# Patient Record
Sex: FEMALE | Race: WHITE | Employment: OTHER | URBAN - METROPOLITAN AREA
[De-identification: names, ages, dates, MRNs, and addresses within clinical notes are randomized per-mention and may not be internally consistent; named-entity substitution may affect disease eponyms.]

---

## 2017-01-17 ENCOUNTER — MYC MEDICAL ADVICE (OUTPATIENT)
Dept: FAMILY MEDICINE | Facility: CLINIC | Age: 72
End: 2017-01-17

## 2017-01-17 DIAGNOSIS — F33.1 MAJOR DEPRESSIVE DISORDER, RECURRENT EPISODE, MODERATE (H): ICD-10-CM

## 2017-01-17 DIAGNOSIS — E13.49 OTHER DIABETIC NEUROLOGICAL COMPLICATION ASSOCIATED WITH OTHER SPECIFIED DIABETES MELLITUS (H): Primary | ICD-10-CM

## 2017-01-18 DIAGNOSIS — E11.40 TYPE 2 DIABETES MELLITUS WITH DIABETIC NEUROPATHY (H): Primary | ICD-10-CM

## 2017-01-18 NOTE — TELEPHONE ENCOUNTER
B-D Pen NDL Vashti [id number]      Last Written Prescription Date: 10/26/2015  Last Fill Quantity: 100,  # refills: 3   Last Office Visit with G, [facility] or [facility] prescribing provider: 08/08/2016-Dr Will

## 2017-01-19 NOTE — TELEPHONE ENCOUNTER
Medication is being filled for 1 time refill only due to:  Patient needs to be seen because due for Dm appt with PCP.  Mychart sent  Asif Vargas RN, BSN

## 2017-01-23 RX ORDER — GABAPENTIN 300 MG/1
600 CAPSULE ORAL 4 TIMES DAILY
Qty: 90 CAPSULE | Refills: 1 | Status: SHIPPED | OUTPATIENT
Start: 2017-01-23 | End: 2017-02-22

## 2017-01-23 RX ORDER — BUPROPION HYDROCHLORIDE 100 MG/1
100 TABLET, EXTENDED RELEASE ORAL 2 TIMES DAILY
Qty: 180 TABLET | Refills: 1 | Status: SHIPPED | OUTPATIENT
Start: 2017-01-23 | End: 2017-05-02

## 2017-01-23 NOTE — TELEPHONE ENCOUNTER
See below, pt wants gabapentin and bupropion refill, routed, SH do you want to see pt? Inform pt of plan via [employer].hart    Last OV: 7/27/16 SH-has seen endo several times since  Reason for visit: DM  Date last filled: 8/8/16 with one refill for gabapentin    PHQ-9 score:    PHQ-9 SCORE 7/27/2016   Total Score -   Total Score 18     Lorenza Troncoso RN, BSN  Message handled by Nurse Triage.

## 2017-01-30 ENCOUNTER — OFFICE VISIT (OUTPATIENT)
Dept: FAMILY MEDICINE | Facility: CLINIC | Age: 72
End: 2017-01-30
Payer: COMMERCIAL

## 2017-01-30 ENCOUNTER — RADIANT APPOINTMENT (OUTPATIENT)
Dept: GENERAL RADIOLOGY | Facility: CLINIC | Age: 72
End: 2017-01-30
Attending: NURSE PRACTITIONER
Payer: COMMERCIAL

## 2017-01-30 VITALS
OXYGEN SATURATION: 96 % | SYSTOLIC BLOOD PRESSURE: 138 MMHG | WEIGHT: 179 LBS | TEMPERATURE: 97.9 F | RESPIRATION RATE: 14 BRPM | HEIGHT: 65 IN | HEART RATE: 68 BPM | BODY MASS INDEX: 29.82 KG/M2 | DIASTOLIC BLOOD PRESSURE: 74 MMHG

## 2017-01-30 DIAGNOSIS — Z79.4 TYPE 2 DIABETES MELLITUS WITH DIABETIC NEUROPATHY, WITH LONG-TERM CURRENT USE OF INSULIN (H): Primary | ICD-10-CM

## 2017-01-30 DIAGNOSIS — E11.21 TYPE 2 DIABETES MELLITUS WITH DIABETIC NEPHROPATHY, WITH LONG-TERM CURRENT USE OF INSULIN (H): ICD-10-CM

## 2017-01-30 DIAGNOSIS — M54.16 LUMBAR RADICULAR PAIN: ICD-10-CM

## 2017-01-30 DIAGNOSIS — R87.810 ASCUS WITH POSITIVE HIGH RISK HPV CERVICAL: ICD-10-CM

## 2017-01-30 DIAGNOSIS — M25.552 HIP PAIN, LEFT: ICD-10-CM

## 2017-01-30 DIAGNOSIS — I10 ESSENTIAL HYPERTENSION WITH GOAL BLOOD PRESSURE LESS THAN 140/90: ICD-10-CM

## 2017-01-30 DIAGNOSIS — R87.610 ASCUS WITH POSITIVE HIGH RISK HPV CERVICAL: ICD-10-CM

## 2017-01-30 DIAGNOSIS — E11.40 TYPE 2 DIABETES MELLITUS WITH DIABETIC NEUROPATHY, WITH LONG-TERM CURRENT USE OF INSULIN (H): Primary | ICD-10-CM

## 2017-01-30 DIAGNOSIS — Z79.4 TYPE 2 DIABETES MELLITUS WITH DIABETIC NEPHROPATHY, WITH LONG-TERM CURRENT USE OF INSULIN (H): ICD-10-CM

## 2017-01-30 DIAGNOSIS — F33.1 MAJOR DEPRESSIVE DISORDER, RECURRENT EPISODE, MODERATE (H): ICD-10-CM

## 2017-01-30 DIAGNOSIS — K21.00 GASTROESOPHAGEAL REFLUX DISEASE WITH ESOPHAGITIS: ICD-10-CM

## 2017-01-30 LAB
ALBUMIN SERPL-MCNC: 4.1 G/DL (ref 3.4–5)
ALP SERPL-CCNC: 111 U/L (ref 40–150)
ALT SERPL W P-5'-P-CCNC: 89 U/L (ref 0–50)
ANION GAP SERPL CALCULATED.3IONS-SCNC: 8 MMOL/L (ref 3–14)
AST SERPL W P-5'-P-CCNC: 64 U/L (ref 0–45)
BASOPHILS # BLD AUTO: 0 10E9/L (ref 0–0.2)
BASOPHILS NFR BLD AUTO: 0.6 %
BILIRUB SERPL-MCNC: 0.4 MG/DL (ref 0.2–1.3)
BUN SERPL-MCNC: 10 MG/DL (ref 7–30)
CALCIUM SERPL-MCNC: 10 MG/DL (ref 8.5–10.1)
CHLORIDE SERPL-SCNC: 105 MMOL/L (ref 94–109)
CHOLEST SERPL-MCNC: 154 MG/DL
CO2 SERPL-SCNC: 26 MMOL/L (ref 20–32)
CREAT SERPL-MCNC: 0.69 MG/DL (ref 0.52–1.04)
DIFFERENTIAL METHOD BLD: NORMAL
EOSINOPHIL # BLD AUTO: 0.6 10E9/L (ref 0–0.7)
EOSINOPHIL NFR BLD AUTO: 7.6 %
ERYTHROCYTE [DISTWIDTH] IN BLOOD BY AUTOMATED COUNT: 14.1 % (ref 10–15)
GFR SERPL CREATININE-BSD FRML MDRD: 84 ML/MIN/1.7M2
GLUCOSE SERPL-MCNC: 244 MG/DL (ref 70–99)
HBA1C MFR BLD: 11.1 % (ref 4.3–6)
HCT VFR BLD AUTO: 38.6 % (ref 35–47)
HDLC SERPL-MCNC: 41 MG/DL
HGB BLD-MCNC: 12.5 G/DL (ref 11.7–15.7)
LDLC SERPL CALC-MCNC: 84 MG/DL
LYMPHOCYTES # BLD AUTO: 2.4 10E9/L (ref 0.8–5.3)
LYMPHOCYTES NFR BLD AUTO: 32.6 %
MCH RBC QN AUTO: 29.8 PG (ref 26.5–33)
MCHC RBC AUTO-ENTMCNC: 32.4 G/DL (ref 31.5–36.5)
MCV RBC AUTO: 92 FL (ref 78–100)
MONOCYTES # BLD AUTO: 0.6 10E9/L (ref 0–1.3)
MONOCYTES NFR BLD AUTO: 7.7 %
NEUTROPHILS # BLD AUTO: 3.7 10E9/L (ref 1.6–8.3)
NEUTROPHILS NFR BLD AUTO: 51.5 %
NONHDLC SERPL-MCNC: 113 MG/DL
PLATELET # BLD AUTO: 315 10E9/L (ref 150–450)
POTASSIUM SERPL-SCNC: 4.4 MMOL/L (ref 3.4–5.3)
PROT SERPL-MCNC: 7.6 G/DL (ref 6.8–8.8)
RBC # BLD AUTO: 4.2 10E12/L (ref 3.8–5.2)
SODIUM SERPL-SCNC: 139 MMOL/L (ref 133–144)
TRIGL SERPL-MCNC: 144 MG/DL
WBC # BLD AUTO: 7.3 10E9/L (ref 4–11)

## 2017-01-30 PROCEDURE — 80061 LIPID PANEL: CPT | Performed by: NURSE PRACTITIONER

## 2017-01-30 PROCEDURE — 87624 HPV HI-RISK TYP POOLED RSLT: CPT | Performed by: NURSE PRACTITIONER

## 2017-01-30 PROCEDURE — 73502 X-RAY EXAM HIP UNI 2-3 VIEWS: CPT

## 2017-01-30 PROCEDURE — 83036 HEMOGLOBIN GLYCOSYLATED A1C: CPT | Performed by: NURSE PRACTITIONER

## 2017-01-30 PROCEDURE — 85025 COMPLETE CBC W/AUTO DIFF WBC: CPT | Performed by: NURSE PRACTITIONER

## 2017-01-30 PROCEDURE — 88175 CYTOPATH C/V AUTO FLUID REDO: CPT | Performed by: NURSE PRACTITIONER

## 2017-01-30 PROCEDURE — 82043 UR ALBUMIN QUANTITATIVE: CPT | Performed by: NURSE PRACTITIONER

## 2017-01-30 PROCEDURE — 36415 COLL VENOUS BLD VENIPUNCTURE: CPT | Performed by: NURSE PRACTITIONER

## 2017-01-30 PROCEDURE — 99214 OFFICE O/P EST MOD 30 MIN: CPT | Performed by: NURSE PRACTITIONER

## 2017-01-30 PROCEDURE — 80053 COMPREHEN METABOLIC PANEL: CPT | Performed by: NURSE PRACTITIONER

## 2017-01-30 RX ORDER — QUETIAPINE FUMARATE 50 MG/1
50 TABLET, FILM COATED ORAL AT BEDTIME
Qty: 90 TABLET | Refills: 1 | Status: SHIPPED | OUTPATIENT
Start: 2017-01-30 | End: 2017-05-02

## 2017-01-30 RX ORDER — VENLAFAXINE HYDROCHLORIDE 150 MG/1
150 CAPSULE, EXTENDED RELEASE ORAL DAILY
Qty: 90 CAPSULE | Refills: 1 | Status: SHIPPED | OUTPATIENT
Start: 2017-01-30 | End: 2017-05-02

## 2017-01-30 RX ORDER — GLIPIZIDE 10 MG/1
10 TABLET, FILM COATED, EXTENDED RELEASE ORAL 2 TIMES DAILY
Qty: 180 TABLET | Refills: 1 | Status: SHIPPED | OUTPATIENT
Start: 2017-01-30 | End: 2017-05-02

## 2017-01-30 ASSESSMENT — ANXIETY QUESTIONNAIRES
7. FEELING AFRAID AS IF SOMETHING AWFUL MIGHT HAPPEN: MORE THAN HALF THE DAYS
GAD7 TOTAL SCORE: 14
3. WORRYING TOO MUCH ABOUT DIFFERENT THINGS: MORE THAN HALF THE DAYS
5. BEING SO RESTLESS THAT IT IS HARD TO SIT STILL: MORE THAN HALF THE DAYS
1. FEELING NERVOUS, ANXIOUS, OR ON EDGE: MORE THAN HALF THE DAYS
6. BECOMING EASILY ANNOYED OR IRRITABLE: MORE THAN HALF THE DAYS
2. NOT BEING ABLE TO STOP OR CONTROL WORRYING: MORE THAN HALF THE DAYS
IF YOU CHECKED OFF ANY PROBLEMS ON THIS QUESTIONNAIRE, HOW DIFFICULT HAVE THESE PROBLEMS MADE IT FOR YOU TO DO YOUR WORK, TAKE CARE OF THINGS AT HOME, OR GET ALONG WITH OTHER PEOPLE: SOMEWHAT DIFFICULT

## 2017-01-30 ASSESSMENT — PATIENT HEALTH QUESTIONNAIRE - PHQ9: 5. POOR APPETITE OR OVEREATING: MORE THAN HALF THE DAYS

## 2017-01-30 NOTE — PROGRESS NOTES
SUBJECTIVE:                                                    Patricia Aden is a 71 year old female who presents to clinic today for the following health issues:    Diabetes Follow-up      Patient is checking blood sugars: 2 times a week,  Fasting this morning 272.      Diabetic concerns: None     Symptoms of hypoglycemia (low blood sugar): none     Paresthesias (numbness or burning in feet) or sores: Yes      Date of last diabetic eye exam: 12/2016     Amount of exercise or physical activity: None    Problems taking medications regularly: No    Medication side effects: none    Diet: low salt and diabetic  Last A1C was 10.6.  Taking Glipzide 10 mg bid, metormin 1000 mg bid and lvemir 30 units every day.    Eating in the evenings, has craving for sugars.      Hypertension:  BP today 138/74, taking metoprolol 100 mg bid.   Cervical cancer screening: Colposcopy in 8/2016 ASCUS with HPV.  Recommend pap with cotest in 6 months.  Back pain: left lwoer back and left gluteal pain has increased over the past month, no known injury.  Seen by pain clinic 6 months for epidural injection, intersted in having this repeated.   Depression:  Feels it is well controlled. PHQ 9 score of 17, MELVINA 7 score of 14.    Problem list and histories reviewed & adjusted, as indicated.  Additional history: as documented    Patient Active Problem List   Diagnosis     Hyperlipidemia LDL goal <100     Osteoporosis     Advanced directives, counseling/discussion     Urgency incontinence     Anxiety     Health Care Home     Status post revision of total hip     Type 2 diabetes mellitus with diabetic nephropathy (H)     Microalbuminuria     Status post total knee replacement     Insulin-requiring or dependent type II diabetes mellitus (H)     Diabetes mellitus with coincident hypertension (H)     Pneumonia, organism unspecified     Constipation     Physical deconditioning     Hypothyroidism     Type 2 diabetes mellitus with diabetic neuropathy (H)      Hip pain     Fracture, femur (H)     Other specified hypothyroidism     Thyroid nodule     Hypoxia     Closed fracture of neck of right femur with routine healing, subsequent encounter     Encounter for long-term (current) use of insulin (H)     Essential hypertension with goal blood pressure less than 140/90     ASCUS with positive high risk HPV cervical     Major depressive disorder, recurrent episode, moderate (H)     Past Surgical History   Procedure Laterality Date     Orthopedic surgery       Cholecystectomy       Colonoscopy       Arthroplasty hip bilateral       Arthroplasty revision hip  7/9/2014     Procedure: ARTHROPLASTY REVISION HIP;  Surgeon: Huber Delgado MD;  Location: [facility] OR     Arthroplasty knee Left 12/8/2015     Procedure: ARTHROPLASTY KNEE;  Surgeon: Huber Delgado MD;  Location: [facility] OR       Social History   Substance Use Topics     Smoking status: Former Smoker -- 4 years     Types: Cigarettes     Smokeless tobacco: Never Used      Comment: quit 1977     Alcohol Use: No     Family History   Problem Relation Age of Onset     DIABETES Mother      DIABETES Maternal Grandmother      CANCER Mother      HEART DISEASE Father          Current Outpatient Prescriptions   Medication Sig Dispense Refill     gabapentin (NEURONTIN) 300 MG capsule Take 2 capsules (600 mg) by mouth 4 times daily 90 capsule 1     buPROPion (BUDEPRION SR) 100 MG 12 hr tablet Take 1 tablet (100 mg) by mouth 2 times daily 180 tablet 1     insulin pen needle (BD NILDA U/F) 32G X 4 MM Use 1-2 daily or as directed with LUCAS Blood, PT HAS REFILLS AT [facility] 100 each 0     gemfibrozil (LOPID) 600 MG tablet Take 1 tablet (600 mg) by mouth 2 times daily 60 tablet 3     glipiZIDE (GLIPIZIDE XL) 10 MG 24 hr tablet Take 1 tablet (10 mg) by mouth 2 times daily 60 tablet 3     metFORMIN (GLUCOPHAGE) 1000 MG tablet Take 1 tablet (1,000 mg) by mouth 2 times daily (with meals) 180 tablet 2     ACETAMINOPHEN PO Take 500 mg by mouth        QUEtiapine (SEROQUEL) 50 MG tablet Take 1 tablet (50 mg) by mouth At Bedtime (Patient taking differently: Take 50 mg by mouth nightly as needed ) 90 tablet 1     nystatin (MYCOSTATIN) cream Apply topically 3 times daily 60 g 0     insulin detemir (LEVEMIR) 100 UNIT/ML soln Inject 30 Units Subcutaneous At Bedtime 1 Month 11     metoprolol (LOPRESSOR) 100 MG tablet Take 1 tablet (100 mg) by mouth 2 times daily 180 tablet 3     simvastatin (ZOCOR) 80 MG tablet Take 0.5 tablets (40 mg) by mouth At Bedtime 45 tablet 3     venlafaxine (EFFEXOR-XR) 150 MG 24 hr capsule Take 1 capsule (150 mg) by mouth daily With breakfast 90 capsule 1     omeprazole (PRILOSEC) 20 MG capsule Take 1 capsule (20 mg) by mouth daily 90 capsule 1     blood glucose monitoring (ONE TOUCH DELICA) lancets Use to test blood sugars 3 times daily or as directed. 1 Box prn     blood glucose (ONE TOUCH ULTRA) test strip Use to test blood sugar 3 times daily or as directed. 100 strip 3     Omega-3 Fatty Acids (OMEGA-3 FISH OIL PO) Take 1,200 mg by mouth every evening        VITAMIN D, CHOLECALCIFEROL, PO Take 5,000 Units by mouth daily        Multiple Vitamins-Minerals (MULTIVITAMIN & MINERAL PO) Take 1 tablet by mouth every evening        Allergies   Allergen Reactions     Fish Allergy Anaphylaxis     Rowland roughy-anaphylaxsis     Penicillins Unknown     Recent Labs   Lab Test  11/04/16   1331  10/10/16   2156  09/02/16   1324   07/27/16   1121  06/06/16   0705   05/25/16   0645  04/22/16   1448   12/01/15   1103   04/15/15   1215   01/13/15   1139   11/11/13   1105   A1C  10.6*   --    --    --   8.2*   --    --   9.8*  10.0*   < >  9.2*   < >  11.0*   < >  11.0*   < >  8.6*   LDL   --    --    --    --    --    --    --    --   71   --    --    --    --    --   77   --   75   HDL   --    --    --    --    --    --    --    --   44*   --    --    --    --    --   47*   --   42*   TRIG   --    --    --    --    --    --    --    --   141   --    --   "  --    --    --   154*   --   132   ALT   --    --    --    --    --    --    --    --   68*   --   46   --   74*   < >  108*   --   99*   CR   --   0.71   --    --    --   0.61   < >  0.75  0.72   < >  0.71   < >  0.70   < >  0.62   < >  0.73   GFRESTIMATED   --   81   --    --    --   >90  Non  GFR Calc     < >  77  80   < >  81   < >  83   < >  >90  Non  GFR Calc     < >  80   GFRESTBLACK   --   >90   GFR Calc     --    --    --   >90   GFR Calc     < >  >90   GFR Calc    >90   GFR Calc     < >  >90   GFR Calc     < >  >90   GFR Calc     < >  >90   GFR Calc     < >  >90   POTASSIUM   --   4.6   --    --    --   4.1   < >  4.5  4.4   < >  4.0   < >  4.5   < >  4.4   < >  4.9   TSH  3.69   --   2.76   < >  4.91*   --    --    --   1.02   --    --    --    --    --    --    < >   --     < > = values in this interval not displayed.      BP Readings from Last 3 Encounters:   01/30/17 138/74   11/04/16 136/72   10/10/16 148/79    Wt Readings from Last 3 Encounters:   01/30/17 179 lb (81.194 kg)   11/04/16 172 lb (78.019 kg)   10/10/16 175 lb (79.379 kg)        ROS:  C: NEGATIVE for fever, chills, change in weight  E/M: NEGATIVE for ear, mouth and throat problems  R: NEGATIVE for significant cough or SOB  CV: NEGATIVE for chest pain, palpitations or peripheral edema  : negative for dysuria.    MUSCULOSKELETAL: increased lower back and left hip pain, last injection 6 months ago.      OBJECTIVE:                                                    /72 mmHg  Pulse 68  Temp(Src) 97.9  F (36.6  C) (Oral)  Resp 14  Ht 5' 5\" (1.651 m)  Wt 179 lb (81.194 kg)  BMI 29.79 kg/m2  SpO2 96%  Body mass index is 29.79 kg/(m^2).   GENERAL: healthy, alert and no distress  NECK: no adenopathy, no asymmetry, masses, or scars and thyroid normal to palpation  RESP: lungs clear to " auscultation - no rales, rhonchi or wheezes  CV: regular rate and rhythm, normal S1 S2, no S3 or S4, no murmur, click or rub, no peripheral edema   MS: left lower back and left gluteal tenderness upon palpation, no gross musculoskeletal defects noted, no edema  NEURO: Normal strength and tone, mentation intact and speech normal  Diabetic Foot Screen:  Any complaints of increased pain or numbness ? No   Is there a foot ulcer now or a history of foot ulcer? No   Does the foot have an abnormal shape? No   Are the nails thick, too long or ingrown? No   Are there any redness or open areas? No            Sensation Testing done at all points on the diagram with monofilament     Right Foot: Sensation Normal at all points  Left Foot: Sensation Normal at all points     Risk Category: 0- No loss of protective sensation  Performed by Susan Haase, APRN CNP       ASSESSMENT:                                                    See below    PLAN:                                                    Patricia was seen today for diabetes.    Diagnoses and all orders for this visit:    Type 2 diabetes mellitus with diabetic neuropathy, with long-term current use of insulin (H):  A1C today 11.1, will increase levemir to 35 units at bedtime.  -     CBC with platelets differential  -     Comprehensive metabolic panel  -     Lipid panel reflex to direct LDL  -     Hemoglobin A1c  -     Albumin Random Urine Quantitative  -     insulin detemir (LEVEMIR) 100 UNIT/ML injection; Inject 35 Units Subcutaneous At Bedtime for 30 doses  -     glipiZIDE (GLIPIZIDE XL) 10 MG 24 hr tablet; Take 1 tablet (10 mg) by mouth 2 times daily    Essential hypertension with goal blood pressure less than 140/90:  Well controlled    ASCUS with positive high risk HPV cervical  -     Pap imaged thin layer diagnostic with HPV (select HPV order below)  -     HPV High Risk Types DNA Cervical    Hip pain, left  -     XR Hip Left 2-3 Views; Future    Major depressive disorder,  recurrent episode, moderate (H)  -     QUEtiapine (SEROQUEL) 50 MG tablet; Take 1 tablet (50 mg) by mouth At Bedtime  -     venlafaxine (EFFEXOR-XR) 150 MG 24 hr capsule; Take 1 capsule (150 mg) by mouth daily With breakfast    Gastroesophageal reflux disease with esophagitis  -     omeprazole (PRILOSEC) 20 MG CR capsule; Take 1 capsule (20 mg) by mouth daily    Other orders  -     DEPRESSION ACTION PLAN (DAP)        FUTURE APPOINTMENTS:       - Follow-up visit in 3 months, sooner as needed.    Susan Haase, APRN [facility]

## 2017-01-30 NOTE — NURSING NOTE
"Chief Complaint   Patient presents with     Diabetes       Initial /72 mmHg  Pulse 68  Temp(Src) 97.9  F (36.6  C) (Oral)  Resp 14  Ht 5' 5\" (1.651 m)  Wt 179 lb (81.194 kg)  BMI 29.79 kg/m2  SpO2 96% Estimated body mass index is 29.79 kg/(m^2) as calculated from the following:    Height as of this encounter: 5' 5\" (1.651 m).    Weight as of this encounter: 179 lb (81.194 kg).  BP completed using cuff size: rachel Reyes CMA      "

## 2017-01-30 NOTE — Clinical Note
[facility]  [id number] [place] [id number] [phone]      February 3, 2017    Patricia Aden  [address] [facility] [address]    Dear Patricia,  This letter is in regards to the PAP smear you had done on 01/30/17. Your PAP (cervical cells) test result is normal.  Your HPV (Human Papilloma Virus) result again showed evidence of high risk types of HPV. To monitor, Susan Haase CNP, is recommending that we repeat a pap and HPV test again in 6 months. Please return in August 2017 for your next pap smear & HPV test.  If you have additional questions regarding this result, please call Leena NEIL at [phone].  Sincerely,  Susan Haase, APRN CNP/Lynette Nissen RN

## 2017-01-30 NOTE — MR AVS SNAPSHOT
After Visit Summary   1/30/2017    Patricia Aden    MRN: [id number]           Patient Information     Date Of Birth          1945        Visit Information        Provider Department      1/30/2017 1:00 PM Haase, Susan Rachele, APRN CNP [facility]        Today's Diagnoses     Essential hypertension with goal blood pressure less than 140/90    -  1     ASCUS with positive high risk HPV cervical         Type 2 diabetes mellitus with diabetic neuropathy, with long-term current use of insulin (H)            Follow-ups after your visit        Follow-up notes from your care team     Return in about 3 months (around 4/30/2017).      Who to contact     If you have questions or need follow up information about today's clinic visit or your schedule please contact [facility] directly at [phone].  Normal or non-critical lab and imaging results will be communicated to you by Noahhart, letter or phone within 4 business days after the clinic has received the results. If you do not hear from us within 7 days, please contact the clinic through Noahhart or phone. If you have a critical or abnormal lab result, we will notify you by phone as soon as possible.  Submit refill requests through [employer] or call your pharmacy and they will forward the refill request to us. Please allow 3 business days for your refill to be completed.          Additional Information About Your Visit        MyChart Information     [employer] gives you secure access to your electronic health record. If you see a primary care provider, you can also send messages to your care team and make appointments. If you have questions, please call your primary care clinic.  If you do not have a primary care provider, please call [phone] and they will assist you.        Care EveryWhere ID     This is your Care EveryWhere ID. This could be used by other organizations to access your [facility]  "records  [id number]        Your Vitals Were     Pulse Temperature Respirations Height BMI (Body Mass Index) Pulse Oximetry    68 97.9  F (36.6  C) (Oral) 14 5' 5\" (1.651 m) 29.79 kg/m2 96%       Blood Pressure from Last 3 Encounters:   01/30/17 160/72   11/04/16 136/72   10/10/16 148/79    Weight from Last 3 Encounters:   01/30/17 179 lb (81.194 kg)   11/04/16 172 lb (78.019 kg)   10/10/16 175 lb (79.379 kg)              We Performed the Following     Albumin Random Urine Quantitative     CBC with platelets differential     Comprehensive metabolic panel     DEPRESSION ACTION PLAN (DAP)     Hemoglobin A1c     HPV High Risk Types DNA Cervical     Lipid panel reflex to direct LDL     Pap imaged thin layer diagnostic with HPV (select HPV order below)          Today's Medication Changes          These changes are accurate as of: 1/30/17  2:03 PM.  If you have any questions, ask your nurse or doctor.               These medicines have changed or have updated prescriptions.        Dose/Directions    insulin detemir 100 UNIT/ML injection   Commonly known as:  LEVEMIR   This may have changed:  how much to take   Used for:  Type 2 diabetes mellitus with diabetic neuropathy, with long-term current use of insulin (H)   Changed by:  Haase, Susan Rachele, APRN CNP        Dose:  35 Units   Inject 35 Units Subcutaneous At Bedtime for 30 doses   Quantity:  10.5 mL   Refills:  3       QUEtiapine 50 MG tablet   Commonly known as:  SEROQUEL   This may have changed:    - when to take this  - reasons to take this   Used for:  Major depressive disorder, recurrent episode, moderate (H)        Dose:  50 mg   Take 1 tablet (50 mg) by mouth At Bedtime   Quantity:  90 tablet   Refills:  1            Where to get your medicines      These medications were sent to [facility] - [place], [place] - [address] AT [facility] [address] [facility] [address]     Phone:  [phone]    - insulin detemir " 100 UNIT/ML injection             Primary Care Provider Office Phone # Fax #    Susan Rachele Haase, APRN [phone]       [address] [facility] [address]        Thank you!     Thank you for choosing [facility]  for your care. Our goal is always to provide you with excellent care. Hearing back from our patients is one way we can continue to improve our services. Please take a few minutes to complete the written survey that you may receive in the mail after your visit with us. Thank you!             Your Updated Medication List - Protect others around you: Learn how to safely use, store and throw away your medicines at www.disposemymeds.org.          This list is accurate as of: 1/30/17  2:03 PM.  Always use your most recent med list.                   Brand Name Dispense Instructions for use    ACETAMINOPHEN PO      Take 500 mg by mouth       blood glucose monitoring lancets     1 Box    Use to test blood sugars 3 times daily or as directed.       blood glucose monitoring test strip    ONE TOUCH ULTRA    100 strip    Use to test blood sugar 3 times daily or as directed.       buPROPion 100 MG 12 hr tablet    BUDEPRION SR    180 tablet    Take 1 tablet (100 mg) by mouth 2 times daily       gabapentin 300 MG capsule    NEURONTIN    90 capsule    Take 2 capsules (600 mg) by mouth 4 times daily       glipiZIDE 10 MG 24 hr tablet    glipiZIDE XL    60 tablet    Take 1 tablet (10 mg) by mouth 2 times daily       insulin detemir 100 UNIT/ML injection    LEVEMIR    10.5 mL    Inject 35 Units Subcutaneous At Bedtime for 30 doses       insulin pen needle 32G X 4 MM    BD NILDA U/F    100 each    Use 1-2 daily or as directed with LUCAS Blood, PT HAS REFILLS AT [facility]       metFORMIN 1000 MG tablet    GLUCOPHAGE    180 tablet    Take 1 tablet (1,000 mg) by mouth 2 times daily (with meals)       metoprolol 100 MG tablet    LOPRESSOR    180 tablet     Take 1 tablet (100 mg) by mouth 2 times daily       MULTIVITAMIN & MINERAL PO      Take 1 tablet by mouth every evening       nystatin cream    MYCOSTATIN    60 g    Apply topically 3 times daily       OMEGA-3 FISH OIL PO      Take 1,200 mg by mouth every evening       omeprazole 20 MG CR capsule    priLOSEC    90 capsule    Take 1 capsule (20 mg) by mouth daily       QUEtiapine 50 MG tablet    SEROQUEL    90 tablet    Take 1 tablet (50 mg) by mouth At Bedtime       simvastatin 80 MG tablet    ZOCOR    45 tablet    Take 0.5 tablets (40 mg) by mouth At Bedtime       venlafaxine 150 MG 24 hr capsule    EFFEXOR-XR    90 capsule    Take 1 capsule (150 mg) by mouth daily With breakfast       VITAMIN D (CHOLECALCIFEROL) PO      Take 5,000 Units by mouth daily

## 2017-01-31 ENCOUNTER — TELEPHONE (OUTPATIENT)
Dept: PALLIATIVE MEDICINE | Facility: CLINIC | Age: 72
End: 2017-01-31

## 2017-01-31 LAB
CREAT UR-MCNC: 130 MG/DL
MICROALBUMIN UR-MCNC: 544 MG/L
MICROALBUMIN/CREAT UR: 418.46 MG/G CR (ref 0–25)

## 2017-01-31 ASSESSMENT — ANXIETY QUESTIONNAIRES: GAD7 TOTAL SCORE: 14

## 2017-01-31 ASSESSMENT — PATIENT HEALTH QUESTIONNAIRE - PHQ9: SUM OF ALL RESPONSES TO PHQ QUESTIONS 1-9: 17

## 2017-01-31 NOTE — PROGRESS NOTES
Quick Note:    Sid Gomez,   Your lab results are as below:  1) CBC (chehcks for anemia and infection) was normal.   2) Cholesterol was normal at 154, your LDL (bad cholesterol) was also normal. Continue on your pravastatin as prescribed.   3) Glucose was elevated at 244 (normal fasting is <100).    If you have any questions do not hesitate to call the clinic to discuss the results with me further.     Sincerely,    Susan Haase, CNP      ______

## 2017-01-31 NOTE — TELEPHONE ENCOUNTER
Called patient to scheduled LESI, interlaminar. LM 1/31/17    [facility] [place]

## 2017-01-31 NOTE — PROGRESS NOTES
Quick Note:    Hi Patricia,  The xray of your left hip is normal.  Sincerely,   Susan Haase, SUSANNE  ______

## 2017-02-01 LAB
COPATH REPORT: NORMAL
PAP: NORMAL

## 2017-02-03 LAB
FINAL DIAGNOSIS: ABNORMAL
HPV HR 12 DNA CVX QL NAA+PROBE: POSITIVE
HPV16 DNA SPEC QL NAA+PROBE: POSITIVE
HPV18 DNA SPEC QL NAA+PROBE: NEGATIVE
SPECIMEN DESCRIPTION: ABNORMAL

## 2017-02-03 NOTE — PROGRESS NOTES
Quick Note:    Hi Patricia,  Your urine albumin (checks for kidney function) was elevated, I would recommend that we recheck this when you return in 3 months.  Sincerely,   Susan Haase, SUSANNE  ______

## 2017-02-22 DIAGNOSIS — E13.49 OTHER DIABETIC NEUROLOGICAL COMPLICATION ASSOCIATED WITH OTHER SPECIFIED DIABETES MELLITUS (H): ICD-10-CM

## 2017-02-22 RX ORDER — GABAPENTIN 300 MG/1
600 CAPSULE ORAL 4 TIMES DAILY
Qty: 240 CAPSULE | Refills: 1 | Status: SHIPPED | OUTPATIENT
Start: 2017-02-22 | End: 2017-05-02

## 2017-02-22 NOTE — TELEPHONE ENCOUNTER
gabapentin (NEURONTIN)       Last Written Prescription Date:  1/23/17  Last Fill Quantity: 90,   # refills: 1  [place] [facility] or  [facility] prescribing provider: Haase 1/3/17  Future Office visit:       Routing refill request to provider for review/approval because:  Drug not on the [facility] or [facility] refill protocol or controlled substance

## 2017-04-24 ENCOUNTER — TELEPHONE (OUTPATIENT)
Dept: FAMILY MEDICINE | Facility: CLINIC | Age: 72
End: 2017-04-24

## 2017-04-24 NOTE — TELEPHONE ENCOUNTER
Panel Management Review      Patient has the following on her problem list:     Diabetes    ASA: Not Required     Last A1C  Lab Results   Component Value Date    A1C 11.1 01/30/2017    A1C 10.6 11/04/2016    A1C 8.2 07/27/2016    A1C 9.8 05/25/2016    A1C 10.0 04/22/2016     A1C tested: FAILED    Last LDL:    Lab Results   Component Value Date    CHOL 154 01/30/2017     Lab Results   Component Value Date    HDL 41 01/30/2017     Lab Results   Component Value Date    LDL 84 01/30/2017     Lab Results   Component Value Date    TRIG 144 01/30/2017     Lab Results   Component Value Date    CHOLHDLRATIO 3.3 01/13/2015     Lab Results   Component Value Date    NHDL 113 01/30/2017       Is the patient on a Statin? YES             Is the patient on Aspirin? YES    Medications     HMG CoA Reductase Inhibitors    simvastatin (ZOCOR) 80 MG tablet          Last three blood pressure readings:  BP Readings from Last 3 Encounters:   01/30/17 138/74   11/04/16 136/72   10/10/16 148/79       Date of last diabetes office visit: 1/30/2017     Tobacco History:     History   Smoking Status     Former Smoker     Years: 4.00     Types: Cigarettes   Smokeless Tobacco     Never Used     Comment: quit 1977           Composite cancer screening  Chart review shows that this patient is due/due soon for the following None  Summary:    Patient is due/failing the following:   A1C    Action needed:   Patient needs office visit for diabetes.    Type of outreach:    Phone, left message for patient to call back.     Questions for provider review:    None                                                                                                                                    [facility]       Chart routed to Care Team .

## 2017-04-29 ENCOUNTER — TELEPHONE (OUTPATIENT)
Dept: NURSING | Facility: CLINIC | Age: 72
End: 2017-04-29

## 2017-04-29 DIAGNOSIS — E11.40 TYPE 2 DIABETES MELLITUS WITH DIABETIC NEUROPATHY (H): ICD-10-CM

## 2017-04-29 NOTE — TELEPHONE ENCOUNTER
Patient out of BD needles for pen. Refilled per protocol.  Insulin pen needles BD Vashti u/f 32 G x 4 MM   Last Written Prescription Date: 1-19-17  Last Fill Quantity: 100,  # refills: 0   Last Office Visit with G, P or [facility] prescribing provider: 1-31-17                                         Next 5 appointments (look out 90 days)     May 02, 2017 10:15 AM CDT   Office Visit with Susan Rachele Haase, APRN [facility]    [address] [place] [id number] [phone]                  Lupe Esquivel RN-[facility]

## 2017-05-02 ENCOUNTER — OFFICE VISIT (OUTPATIENT)
Dept: FAMILY MEDICINE | Facility: CLINIC | Age: 72
End: 2017-05-02
Payer: COMMERCIAL

## 2017-05-02 VITALS
SYSTOLIC BLOOD PRESSURE: 136 MMHG | WEIGHT: 180 LBS | TEMPERATURE: 98 F | RESPIRATION RATE: 14 BRPM | HEIGHT: 65 IN | OXYGEN SATURATION: 99 % | HEART RATE: 70 BPM | BODY MASS INDEX: 29.99 KG/M2 | DIASTOLIC BLOOD PRESSURE: 78 MMHG

## 2017-05-02 DIAGNOSIS — K21.00 GASTROESOPHAGEAL REFLUX DISEASE WITH ESOPHAGITIS: ICD-10-CM

## 2017-05-02 DIAGNOSIS — E13.49 OTHER DIABETIC NEUROLOGICAL COMPLICATION ASSOCIATED WITH OTHER SPECIFIED DIABETES MELLITUS (H): ICD-10-CM

## 2017-05-02 DIAGNOSIS — E78.5 HYPERLIPIDEMIA LDL GOAL <100: ICD-10-CM

## 2017-05-02 DIAGNOSIS — Z79.4 TYPE 2 DIABETES MELLITUS WITH DIABETIC NEPHROPATHY, WITH LONG-TERM CURRENT USE OF INSULIN (H): Primary | ICD-10-CM

## 2017-05-02 DIAGNOSIS — I10 ESSENTIAL HYPERTENSION: ICD-10-CM

## 2017-05-02 DIAGNOSIS — N64.4 BREAST PAIN, LEFT: ICD-10-CM

## 2017-05-02 DIAGNOSIS — E11.21 TYPE 2 DIABETES MELLITUS WITH DIABETIC NEPHROPATHY, WITH LONG-TERM CURRENT USE OF INSULIN (H): Primary | ICD-10-CM

## 2017-05-02 DIAGNOSIS — F33.1 MAJOR DEPRESSIVE DISORDER, RECURRENT EPISODE, MODERATE (H): ICD-10-CM

## 2017-05-02 LAB — HBA1C MFR BLD: 9.4 % (ref 4.3–6)

## 2017-05-02 PROCEDURE — 36415 COLL VENOUS BLD VENIPUNCTURE: CPT | Performed by: NURSE PRACTITIONER

## 2017-05-02 PROCEDURE — 82043 UR ALBUMIN QUANTITATIVE: CPT | Performed by: NURSE PRACTITIONER

## 2017-05-02 PROCEDURE — 80048 BASIC METABOLIC PNL TOTAL CA: CPT | Performed by: NURSE PRACTITIONER

## 2017-05-02 PROCEDURE — 83036 HEMOGLOBIN GLYCOSYLATED A1C: CPT | Performed by: NURSE PRACTITIONER

## 2017-05-02 PROCEDURE — 99214 OFFICE O/P EST MOD 30 MIN: CPT | Performed by: NURSE PRACTITIONER

## 2017-05-02 RX ORDER — METOPROLOL TARTRATE 100 MG
100 TABLET ORAL 2 TIMES DAILY
Qty: 180 TABLET | Refills: 3 | Status: SHIPPED | OUTPATIENT
Start: 2017-05-02 | End: 2018-05-29

## 2017-05-02 RX ORDER — QUETIAPINE FUMARATE 50 MG/1
50 TABLET, FILM COATED ORAL AT BEDTIME
Qty: 90 TABLET | Refills: 1 | Status: SHIPPED | OUTPATIENT
Start: 2017-05-02 | End: 2018-05-29

## 2017-05-02 RX ORDER — VENLAFAXINE HYDROCHLORIDE 150 MG/1
150 CAPSULE, EXTENDED RELEASE ORAL DAILY
Qty: 90 CAPSULE | Refills: 1 | Status: SHIPPED | OUTPATIENT
Start: 2017-05-02 | End: 2018-03-10

## 2017-05-02 RX ORDER — GLIPIZIDE 10 MG/1
10 TABLET, FILM COATED, EXTENDED RELEASE ORAL 2 TIMES DAILY
Qty: 180 TABLET | Refills: 1 | Status: SHIPPED | OUTPATIENT
Start: 2017-05-02 | End: 2018-07-13

## 2017-05-02 RX ORDER — SIMVASTATIN 80 MG
40 TABLET ORAL AT BEDTIME
Qty: 45 TABLET | Refills: 3 | Status: SHIPPED | OUTPATIENT
Start: 2017-05-02 | End: 2018-04-10

## 2017-05-02 RX ORDER — GABAPENTIN 300 MG/1
600 CAPSULE ORAL 4 TIMES DAILY
Qty: 240 CAPSULE | Refills: 1 | Status: SHIPPED | OUTPATIENT
Start: 2017-05-02 | End: 2017-08-29

## 2017-05-02 RX ORDER — BUPROPION HYDROCHLORIDE 100 MG/1
100 TABLET, EXTENDED RELEASE ORAL 2 TIMES DAILY
Qty: 180 TABLET | Refills: 1 | Status: SHIPPED | OUTPATIENT
Start: 2017-05-02 | End: 2018-05-24

## 2017-05-02 ASSESSMENT — ANXIETY QUESTIONNAIRES
3. WORRYING TOO MUCH ABOUT DIFFERENT THINGS: SEVERAL DAYS
6. BECOMING EASILY ANNOYED OR IRRITABLE: MORE THAN HALF THE DAYS
GAD7 TOTAL SCORE: 11
2. NOT BEING ABLE TO STOP OR CONTROL WORRYING: SEVERAL DAYS
5. BEING SO RESTLESS THAT IT IS HARD TO SIT STILL: MORE THAN HALF THE DAYS
IF YOU CHECKED OFF ANY PROBLEMS ON THIS QUESTIONNAIRE, HOW DIFFICULT HAVE THESE PROBLEMS MADE IT FOR YOU TO DO YOUR WORK, TAKE CARE OF THINGS AT HOME, OR GET ALONG WITH OTHER PEOPLE: VERY DIFFICULT
1. FEELING NERVOUS, ANXIOUS, OR ON EDGE: MORE THAN HALF THE DAYS
7. FEELING AFRAID AS IF SOMETHING AWFUL MIGHT HAPPEN: SEVERAL DAYS

## 2017-05-02 ASSESSMENT — PATIENT HEALTH QUESTIONNAIRE - PHQ9: 5. POOR APPETITE OR OVEREATING: MORE THAN HALF THE DAYS

## 2017-05-02 NOTE — NURSING NOTE
"Chief Complaint   Patient presents with     Recheck Medication       Initial /78 (BP Location: Left arm, Patient Position: Chair, Cuff Size: Adult Regular)  Pulse 70  Temp 98  F (36.7  C) (Oral)  Resp 14  Ht 5' 5\" (1.651 m)  Wt 180 lb (81.6 kg)  SpO2 99%  BMI 29.95 kg/m2 Estimated body mass index is 29.95 kg/(m^2) as calculated from the following:    Height as of this encounter: 5' 5\" (1.651 m).    Weight as of this encounter: 180 lb (81.6 kg).  Medication Reconciliation: complete   Nikky Reyes CMA      "

## 2017-05-02 NOTE — PROGRESS NOTES
SUBJECTIVE:                                                    Patricia Aden is a 71 year old female who presents to clinic today for the following health issues:    Diabetes Follow-up    Patient is checking blood sugars: not at all    Diabetic concerns: None     Symptoms of hypoglycemia (low blood sugar): none     Paresthesias (numbness or burning in feet) or sores: Yes      Date of last diabetic eye exam: 12/12/2016    Not checking BG at all. Knows that they are probably high. Taking glipizide (10 mg) BID and levemir (35 units) at nighttime. Last A1C in 11/2016 was 10.6%, at that time levemir increased to 35 units.    Hypertension: taking metoprolol 100 mg bid.    Depression -- States daytime fatigue, low motivation, and no desire to get out of bed. Says depression is stable and is not seeing anyone for problem. Has been taking venlafaxine (150 mg) and quetiapine (50 mg) daily for several years. Feels that she is at baseline. Has also been trying to find a job but can't seem to get hired by anyone. Denies thoughts of harming herself or others.   PHQ-9: 16  MELVINA-7: 11    L Breast -- Reports L breast tenderness. Needs help scheduling mammogram.       Amount of exercise or physical activity: None    Problems taking medications regularly: No    Medication side effects: none    Diet: regular (no restrictions)    Problem list and histories reviewed & adjusted, as indicated.  Additional history: as documented    Patient Active Problem List   Diagnosis     Hyperlipidemia LDL goal <100     Osteoporosis     Advanced directives, counseling/discussion     Urgency incontinence     Anxiety     Health Care Home     Status post revision of total hip     Type 2 diabetes mellitus with diabetic nephropathy (H)     Microalbuminuria     Status post total knee replacement     Insulin-requiring or dependent type II diabetes mellitus (H)     Pneumonia, organism unspecified     Constipation     Physical deconditioning     Type 2 diabetes  mellitus with diabetic neuropathy (H)     Hip pain     Other specified hypothyroidism     Thyroid nodule     Hypoxia     Closed fracture of neck of right femur with routine healing, subsequent encounter     Encounter for long-term (current) use of insulin (H)     Essential hypertension with goal blood pressure less than 140/90     ASCUS with positive high risk HPV cervical     Major depressive disorder, recurrent episode, moderate (H)     Lumbar radicular pain     Past Surgical History:   Procedure Laterality Date     ARTHROPLASTY HIP BILATERAL       ARTHROPLASTY KNEE Left 12/8/2015    Procedure: ARTHROPLASTY KNEE;  Surgeon: Huber Delgado MD;  Location: [facility] OR     ARTHROPLASTY REVISION HIP  7/9/2014    Procedure: ARTHROPLASTY REVISION HIP;  Surgeon: Huber Delgado MD;  Location: [facility] OR     CHOLECYSTECTOMY       COLONOSCOPY       ORTHOPEDIC SURGERY         Social History   Substance Use Topics     Smoking status: Former Smoker     Years: 4.00     Types: Cigarettes     Smokeless tobacco: Never Used      Comment: quit 1977     Alcohol use No     Family History   Problem Relation Age of Onset     DIABETES Mother      CANCER Mother      HEART DISEASE Father      DIABETES Maternal Grandmother          Current Outpatient Prescriptions   Medication Sig Dispense Refill     insulin pen needle (BD NILDA U/F) 32G X 4 MM Use 1-2 daily or as directed with LUCAS Blood, PT HAS REFILLS AT [facility] 100 each 0     gabapentin (NEURONTIN) 300 MG capsule Take 2 capsules (600 mg) by mouth 4 times daily 240 capsule 1     glipiZIDE (GLIPIZIDE XL) 10 MG 24 hr tablet Take 1 tablet (10 mg) by mouth 2 times daily 180 tablet 1     QUEtiapine (SEROQUEL) 50 MG tablet Take 1 tablet (50 mg) by mouth At Bedtime 90 tablet 1     venlafaxine (EFFEXOR-XR) 150 MG 24 hr capsule Take 1 capsule (150 mg) by mouth daily With breakfast 90 capsule 1     omeprazole (PRILOSEC) 20 MG CR capsule Take 1 capsule (20 mg) by mouth daily 90 capsule 1      buPROPion (BUDEPRION SR) 100 MG 12 hr tablet Take 1 tablet (100 mg) by mouth 2 times daily 180 tablet 1     metFORMIN (GLUCOPHAGE) 1000 MG tablet Take 1 tablet (1,000 mg) by mouth 2 times daily (with meals) 180 tablet 2     ACETAMINOPHEN PO Take 500 mg by mouth       nystatin (MYCOSTATIN) cream Apply topically 3 times daily 60 g 0     metoprolol (LOPRESSOR) 100 MG tablet Take 1 tablet (100 mg) by mouth 2 times daily 180 tablet 3     simvastatin (ZOCOR) 80 MG tablet Take 0.5 tablets (40 mg) by mouth At Bedtime 45 tablet 3     blood glucose monitoring (ONE TOUCH DELICA) lancets Use to test blood sugars 3 times daily or as directed. 1 Box prn     blood glucose (ONE TOUCH ULTRA) test strip Use to test blood sugar 3 times daily or as directed. 100 strip 3     Omega-3 Fatty Acids (OMEGA-3 FISH OIL PO) Take 1,200 mg by mouth every evening        VITAMIN D, CHOLECALCIFEROL, PO Take 5,000 Units by mouth daily        Multiple Vitamins-Minerals (MULTIVITAMIN & MINERAL PO) Take 1 tablet by mouth every evening        insulin detemir (LEVEMIR) 100 UNIT/ML injection Inject 35 Units Subcutaneous At Bedtime for 30 doses 10.5 mL 3     Allergies   Allergen Reactions     Fish Allergy Anaphylaxis     [employer] Mdl roughy-anaphylaxsis     Penicillins Unknown     Reviewed and updated as needed this visit by clinical staff    Reviewed and updated as needed this visit by Provider    ROS:  Constitutional, HEENT, cardiovascular, pulmonary, GI, , musculoskeletal, neuro, skin, endocrine and psych systems are negative, except as otherwise noted.    This document serves as a record of the services and decisions personally performed and made by Susan Haase, CNP. It was created on her behalf by Kirti Manuel, a trained medical scribe. The creation of this document is based the provider's statements to the medical scribe.  Kirti Manuel May 2, 2017 10:35 AM   OBJECTIVE:                                                    /78 (BP Location: Left  "arm, Patient Position: Chair, Cuff Size: Adult Regular)  Pulse 70  Temp 98  F (36.7  C) (Oral)  Resp 14  Ht 1.651 m (5' 5\")  Wt 81.6 kg (180 lb)  SpO2 99%  BMI 29.95 kg/m2  Body mass index is 29.95 kg/(m^2).  GENERAL: healthy, alert and no distress  HENT: ear canals and TM's normal, nose and mouth without ulcers or lesions  NECK: no adenopathy, no asymmetry, masses, or scars and thyroid normal to palpation  RESP: lungs clear to auscultation - no rales, rhonchi or wheezes  CV: regular rate and rhythm, normal S1 S2, no S3 or S4, no murmur, click or rub, no peripheral edema   BREAST: L breast w/ tenderness at 6 o'clock. No nodules present, no palpable axillary masses or adenopathy.  Right breast:  normal without masses, tenderness or nipple discharge and no palpable axillary masses or adenopathy  NEURO: Normal strength and tone, mentation intact and speech normal  PSYCH: mentation appears normal, affect normal/bright    Diagnostic Test Results:  none      ASSESSMENT/PLAN:                                                    Patricia was seen today for recheck medication.    Diagnoses and all orders for this visit:    Type 2 diabetes mellitus with diabetic nephropathy, with long-term current use of insulin (H)  -     Basic metabolic panel  -     Albumin Random Urine Quantitative  -     Hemoglobin AC  -     insulin detemir (LEVEMIR) 100 UNIT/ML injection; Inject 35 Units Subcutaneous At Bedtime Inject 35 Units Subcutaneous  -     metFORMIN (GLUCOPHAGE) 1000 MG tablet; Take 1 tablet (1,000 mg) by mouth 2 times daily (with meals)  -     glipiZIDE (GLIPIZIDE XL) 10 MG 24 hr tablet; Take 1 tablet (10 mg) by mouth 2 times daily    Essential hypertension:  Well controlled.  -     metoprolol (LOPRESSOR) 100 MG tablet; Take 1 tablet (100 mg) by mouth 2 times daily    Major depressive disorder, recurrent episode, moderate (H)  -     buPROPion (BUDEPRION SR) 100 MG 12 hr tablet; Take 1 tablet (100 mg) by mouth 2 times daily  -   "   venlafaxine (EFFEXOR-XR) 150 MG 24 hr capsule; Take 1 capsule (150 mg) by mouth daily With breakfast  -     QUEtiapine (SEROQUEL) 50 MG tablet; Take 1 tablet (50 mg) by mouth At Bedtime    Breast pain, left  -     MA Diagnostic Digital Bilateral; Future    Hyperlipidemia LDL goal <100  -     simvastatin (ZOCOR) 80 MG tablet; Take 0.5 tablets (40 mg) by mouth At Bedtime    Gastroesophageal reflux disease with esophagitis  -     omeprazole (PRILOSEC) 20 MG CR capsule; Take 1 capsule (20 mg) by mouth daily    Other diabetic neurological complication associated with other specified diabetes mellitus (H)  -     gabapentin (NEURONTIN) 300 MG capsule; Take 2 capsules (600 mg) by mouth 4 times daily          Follow up visit in 3 months, sooner as needed.    The information in this document, created by the medical scribe for me, accurately reflects the services I personally performed and the decisions made by me. I have reviewed and approved this document for accuracy.   Susan Haase, APRN [facility]

## 2017-05-02 NOTE — MR AVS SNAPSHOT
After Visit Summary   5/2/2017    Patricia Aden    MRN: [id number]           Patient Information     Date Of Birth          1945        Visit Information        Provider Department      5/2/2017 10:15 AM Haase, Susan Rachele, APRN CNP [facility]        Today's Diagnoses     Type 2 diabetes mellitus with diabetic nephropathy, with long-term current use of insulin (H)    -  1    Essential hypertension with goal blood pressure less than 140/90        Hyperlipidemia LDL goal <100        Essential hypertension        Type 2 diabetes mellitus with diabetic neuropathy, with long-term current use of insulin (H)        Breast pain, left           Follow-ups after your visit        Follow-up notes from your care team     Return in about 3 months (around 8/2/2017).      Future tests that were ordered for you today     Open Future Orders        Priority Expected Expires Ordered    MA Diagnostic Digital Bilateral Routine  5/2/2018 5/2/2017            Who to contact     If you have questions or need follow up information about today's clinic visit or your schedule please contact [facility] directly at [phone].  Normal or non-critical lab and imaging results will be communicated to you by Site9hart, letter or phone within 4 business days after the clinic has received the results. If you do not hear from us within 7 days, please contact the clinic through Site9hart or phone. If you have a critical or abnormal lab result, we will notify you by phone as soon as possible.  Submit refill requests through PixelPlay or call your pharmacy and they will forward the refill request to us. Please allow 3 business days for your refill to be completed.          Additional Information About Your Visit        Site9hart Information     PixelPlay gives you secure access to your electronic health record. If you see a primary care provider, you can also send messages to your care team and make  "appointments. If you have questions, please call your primary care clinic.  If you do not have a primary care provider, please call [phone] and they will assist you.        Care EveryWhere ID     This is your Care EveryWhere ID. This could be used by other organizations to access your [facility] medical records  [id number]        Your Vitals Were     Pulse Temperature Respirations Height Pulse Oximetry BMI (Body Mass Index)    70 98  F (36.7  C) (Oral) 14 5' 5\" (1.651 m) 99% 29.95 kg/m2       Blood Pressure from Last 3 Encounters:   05/02/17 136/78   01/30/17 138/74   11/04/16 136/72    Weight from Last 3 Encounters:   05/02/17 180 lb (81.6 kg)   01/30/17 179 lb (81.2 kg)   11/04/16 172 lb (78 kg)              We Performed the Following     Albumin Random Urine Quantitative     Basic metabolic panel     Hemoglobin A1c          Today's Medication Changes          These changes are accurate as of: 5/2/17 10:42 AM.  If you have any questions, ask your nurse or doctor.               These medicines have changed or have updated prescriptions.        Dose/Directions    insulin detemir 100 UNIT/ML injection   Commonly known as:  LEVEMIR   This may have changed:  additional instructions   Used for:  Type 2 diabetes mellitus with diabetic neuropathy, with long-term current use of insulin (H)   Changed by:  Haase, Susan Rachele, APRN CNP        Dose:  35 Units   Inject 35 Units Subcutaneous At Bedtime Inject 35 Units Subcutaneous   Quantity:  10.5 mL   Refills:  3            Where to get your medicines      [facility] [address] [facility] [address]  AT [facility] [address] [facility] [address]     Phone:  [phone]     insulin detemir 100 UNIT/ML injection    metoprolol 100 MG tablet    simvastatin 80 MG tablet                Primary Care Provider Office Phone # Fax #    Susan Rachele Haase, APRN [phone]       [address] [facility] [address] [address]  [facility] [address]        Thank you!     Thank you for choosing [facility]  for your care. Our goal is always to provide you with excellent care. Hearing back from our patients is one way we can continue to improve our services. Please take a few minutes to complete the written survey that you may receive in the mail after your visit with us. Thank you!             Your Updated Medication List - Protect others around you: Learn how to safely use, store and throw away your medicines at www.disposemymeds.org.          This list is accurate as of: 5/2/17 10:42 AM.  Always use your most recent med list.                   Brand Name Dispense Instructions for use    ACETAMINOPHEN PO      Take 500 mg by mouth       blood glucose monitoring lancets     1 Box    Use to test blood sugars 3 times daily or as directed.       blood glucose monitoring test strip    ONE TOUCH ULTRA    100 strip    Use to test blood sugar 3 times daily or as directed.       buPROPion 100 MG 12 hr tablet    BUDEPRION SR    180 tablet    Take 1 tablet (100 mg) by mouth 2 times daily       gabapentin 300 MG capsule    NEURONTIN    240 capsule    Take 2 capsules (600 mg) by mouth 4 times daily       glipiZIDE 10 MG 24 hr tablet    glipiZIDE XL    180 tablet    Take 1 tablet (10 mg) by mouth 2 times daily       insulin detemir 100 UNIT/ML injection    LEVEMIR    10.5 mL    Inject 35 Units Subcutaneous At Bedtime Inject 35 Units Subcutaneous       insulin pen needle 32G X 4 MM    BD NILDA U/F    100 each    Use 1-2 daily or as directed with LUCAS Blood PT HAS REFILLS AT [facility]       metFORMIN 1000 MG tablet    GLUCOPHAGE    180 tablet    Take 1 tablet (1,000 mg) by mouth 2 times daily (with meals)       metoprolol 100 MG tablet    LOPRESSOR    180 tablet    Take 1 tablet (100 mg) by mouth 2 times daily       MULTIVITAMIN & MINERAL PO      Take 1 tablet by mouth every evening        nystatin cream    MYCOSTATIN    60 g    Apply topically 3 times daily       OMEGA-3 FISH OIL PO      Take 1,200 mg by mouth every evening       omeprazole 20 MG CR capsule    priLOSEC    90 capsule    Take 1 capsule (20 mg) by mouth daily       QUEtiapine 50 MG tablet    SEROQUEL    90 tablet    Take 1 tablet (50 mg) by mouth At Bedtime       simvastatin 80 MG tablet    ZOCOR    45 tablet    Take 0.5 tablets (40 mg) by mouth At Bedtime       venlafaxine 150 MG 24 hr capsule    EFFEXOR-XR    90 capsule    Take 1 capsule (150 mg) by mouth daily With breakfast       VITAMIN D (CHOLECALCIFEROL) PO      Take 5,000 Units by mouth daily

## 2017-05-03 LAB
ANION GAP SERPL CALCULATED.3IONS-SCNC: 13 MMOL/L (ref 3–14)
BUN SERPL-MCNC: 12 MG/DL (ref 7–30)
CALCIUM SERPL-MCNC: 9.2 MG/DL (ref 8.5–10.1)
CHLORIDE SERPL-SCNC: 106 MMOL/L (ref 94–109)
CO2 SERPL-SCNC: 24 MMOL/L (ref 20–32)
CREAT SERPL-MCNC: 0.72 MG/DL (ref 0.52–1.04)
CREAT UR-MCNC: 130 MG/DL
GFR SERPL CREATININE-BSD FRML MDRD: 80 ML/MIN/1.7M2
GLUCOSE SERPL-MCNC: 158 MG/DL (ref 70–99)
MICROALBUMIN UR-MCNC: 171 MG/L
MICROALBUMIN/CREAT UR: 131.54 MG/G CR (ref 0–25)
POTASSIUM SERPL-SCNC: 4.2 MMOL/L (ref 3.4–5.3)
SODIUM SERPL-SCNC: 143 MMOL/L (ref 133–144)

## 2017-05-03 ASSESSMENT — PATIENT HEALTH QUESTIONNAIRE - PHQ9: SUM OF ALL RESPONSES TO PHQ QUESTIONS 1-9: 16

## 2017-05-03 ASSESSMENT — ANXIETY QUESTIONNAIRES: GAD7 TOTAL SCORE: 11

## 2017-05-03 NOTE — PROGRESS NOTES
Sid Gomez,  Your A1C (test for diabetes) has improved and is now 9.4%.  This is better but would like it closer to 8%.  Please increase your levemir to 38 units at bedtime.    Sincerely,     Susan Haase, CNP

## 2017-05-03 NOTE — PROGRESS NOTES
Sid Gomez,  Your metabolic panel (checks kidney function) was normal.  Your glucose was elevated at 158.  Sincerely,    Susan Haase, CNP

## 2017-05-03 NOTE — TELEPHONE ENCOUNTER
Pre-screening Questions for Radiology Injections:    Injection to be done at which interventional clinic site? [facility]    Procedure ordered by Haase, Susan Rachele, ADAL CNP    Procedure ordered? Lumbar Epidural Steroid Injection    What insurance would patient like us to bill for this procedure? BCBS      Worker's comp-Any injection DO NOT SCHEDULE and route to Bessie Trevino.      [employer] insurance - For SI joint injections, DO NOT SCHEDULE and route Bessie Trevino.      [employer]- MBB's must be scheduled at LEAST two weeks apart      [employer] - Any injection besides hip/shoulder/knee joint DO NOT SCHEDULE and route to Bessie Trevino. She will obtain PA and call pt back to schedule procedure or notify pt of denial.     HP C[employer]-PA REQUIRED FOR NON-AJAY OR Joint injections    Any chance of pregnancy? NO   If YES, do NOT schedule and route to RN pool    Is an  needed? No     Patient has a drive home? (mandatory) YES: INFORMED    Is patient taking any blood thinners (plavix, coumadin, jantoven, warfarin, heparin, pradaxa or dabigatran )? No   If hold needed, do NOT schedule, route to RN pool     Is patient taking any aspirin products? No     If more than 325mg/day do NOT schedule; route to RN pool     For CERVICAL procedures, hold all aspirin products for 6 days.      Does the patient have a bleeding or clotting disorder? No     If yes, okay to schedule AND route to RN nurse pool    **For any patients with platelet count <100, must be forwarded to provider**    Is patient diabetic?  Yes  If YES, have them bring their glucometer.    Does patient have an active infection or treated for one within the past week? No     Is patient currently taking any antibiotics?  No     For patients on chronic, preventative, or prophylactic antibiotics, procedures may be scheduled.     For patients on antibiotics for active or recent infection:    Claudia Draper, Samira, Skyler Burton-antibiotic course  must have been completed for 4 days    Claudia Braxton-antibiotic course must have been completed for 7 days    Is patient currently taking any steroid medications? (i.e. Prednisone, Medrol)  No     For patients on steroid medications:    Samira Warner [place], Burton-steroid course must have been completed for 4 days    Claudia Braxton-steroid course must have been completed for 7 days    Reviewed with patient:  If you are started on any steroids or antibiotics between now and your appointment, you must contact us because it may affect our ability to perform your procedure.  Yes    Is patient actively being treated for cancer or immunocompromised, including the spleen having been removed? No    If YES, do NOT schedule and route to RN pool     **For Dr. Cloud patients without spleens should have the chart sent to her**    Are you able to get on and off an exam table with minimal or no assistance? Yes  If NO, do NOT schedule and route to RN pool    Are you able to roll over and lay on your stomach with minimal or no assistance? Yes  If NO, do NOT schedule and route to RN pool     Any allergies to contrast dye, iodine, shellfish, or numbing and steroid medications? Yes - Shellfish  If YES, route to RN pool AND add allergy information to appointment notes    Allergies: Fish allergy and Penicillins        Has the patient had a flu shot or any other vaccinations within 7 days before or after the procedure.  No       Does patient have an MRI/CT?  YES: 8/2016  (SI joint, hip injections, lumbar sympathetic blocks, and stellate ganglion blocks do not require an MRI)    Was the MRI done w/in the last 3 years?  Yes    Was MRI done at [place] [facility]? Yes      If not, where was it done? N/A       If MRI was not done at [place] [facility], [facility] or [facility] do NOT schedule and route to nursing.  If pt has an imaging disc, the injection may be scheduled but pt has to bring disc to appt. If they show up w/out disc the  injection cannot be done    Reminders (please tell patient if applicable):       Instructed pt to arrive 30 minutes early for IV start if this is for a cervical procedure, ALL sympathetic (stellate ganglion, hypogastric, or lumbar sympathetic block) and all sedation procedures (RFA, spinal cord stimulation trials).  Not Applicable    -IVs are not routinely placed for [place] cervical case       If NPO for sedation, informed patient that it is okay to take medications with sips of water (except if they are to hold blood thinners).  Not Applicable   *DO take blood pressure medication if it is prescribed*      If this is for a cervical AJAY, informed patient that aspirin needs to be held for 6 days.   Not Applicable      For all patients not having spinal cord stimulator (SCS) trials or radiofrequency ablations (RFAs), informed patient:  IV sedation is not provided for this procedure.  If you feel that an oral anti-anxiety medication is needed, you can discuss this further with your referring provider or primary care provider.  The Pain Clinic provider will discuss specifics of what the procedure includes at your appointment.  Most procedures last 10-20 minutes.  We use numbing medications to help with any discomfort during the procedure.  NO      Do not schedule procedures requiring IV placement in the first appointment of the day or first appointment after lunch. NA      For patients 85 or older we recommend having an adult stay w/ them for the remainder of the day.   NA    Does the patient have any questions?  NO  Tiffanie Hdez  [facility]

## 2017-05-04 ENCOUNTER — HOSPITAL ENCOUNTER (OUTPATIENT)
Dept: ULTRASOUND IMAGING | Facility: CLINIC | Age: 72
End: 2017-05-04
Attending: NURSE PRACTITIONER
Payer: MEDICARE

## 2017-05-04 ENCOUNTER — HOSPITAL ENCOUNTER (OUTPATIENT)
Dept: MAMMOGRAPHY | Facility: CLINIC | Age: 72
Discharge: HOME | End: 2017-05-04
Attending: NURSE PRACTITIONER | Admitting: NURSE PRACTITIONER
Payer: MEDICARE

## 2017-05-04 DIAGNOSIS — N64.4 BREAST PAIN, LEFT: ICD-10-CM

## 2017-05-04 PROCEDURE — G0204 DX MAMMO INCL CAD BI: HCPCS

## 2017-05-04 PROCEDURE — 76642 ULTRASOUND BREAST LIMITED: CPT | Mod: LT

## 2017-05-05 ENCOUNTER — RADIOLOGY INJECTION OFFICE VISIT (OUTPATIENT)
Dept: PALLIATIVE MEDICINE | Facility: CLINIC | Age: 72
End: 2017-05-05
Attending: NURSE PRACTITIONER
Payer: COMMERCIAL

## 2017-05-05 ENCOUNTER — RADIANT APPOINTMENT (OUTPATIENT)
Dept: GENERAL RADIOLOGY | Facility: CLINIC | Age: 72
End: 2017-05-05
Attending: PAIN MEDICINE
Payer: COMMERCIAL

## 2017-05-05 VITALS — OXYGEN SATURATION: 97 % | HEART RATE: 86 BPM | SYSTOLIC BLOOD PRESSURE: 148 MMHG | DIASTOLIC BLOOD PRESSURE: 84 MMHG

## 2017-05-05 DIAGNOSIS — M54.16 LUMBAR RADICULITIS: ICD-10-CM

## 2017-05-05 DIAGNOSIS — M54.16 LUMBAR RADICULAR PAIN: ICD-10-CM

## 2017-05-05 DIAGNOSIS — M43.16 SPONDYLOLISTHESIS OF LUMBAR REGION: Primary | ICD-10-CM

## 2017-05-05 PROCEDURE — 62323 NJX INTERLAMINAR LMBR/SAC: CPT | Performed by: PAIN MEDICINE

## 2017-05-05 NOTE — NURSING NOTE
Injection intake:    If this procedure is requiring IV sedation has the patient been NPO for 6  Hours? NA    Is patient on a prescribed blood thinner such as coumadin, Plavix, Xarelto?    No    Does patient take aspirin?  No    If this is for a cervical procedure and patient is on aspirin has it been held for 6 days?   NA    Any allergies to contrast dye, iodine, steroid and/or numbing medications?  NO    Is patient currently taking antibiotics or have an active infection?  NO    Is patient currently taking oral steroids? NO    Does patient have a ? Yes       Is patient pregnant or breastfeeding?  NO    Are the vital signs normal?  BP - systolic over 170.    Mónica Poole, MSN, RN-BC  Care Coordinator  [facility]

## 2017-05-05 NOTE — MR AVS SNAPSHOT
After Visit Summary   5/5/2017    Patricia Aden    MRN: [id number]           Patient Information     Date Of Birth          1945        Visit Information        Provider Department      5/5/2017 9:15 AM Shayna Cloud MD [facility]        Care Instructions    [facility] Procedure Discharge Instructions    Today you saw:   Dr. Shayna Cloud         Your procedure:  Caudal epidural steroid injection      Medications used:  Lidocaine (anesthetic)    Kenalog (steroid)   Omnipaque (contrast)                  Be cautious when walking as numbness and/or weakness in the legs may occur up to 6-8 hours after the procedure due to effect of the local anesthetic    Do not drive for 6 hours. The effect of the local anesthetic could slow your reflexes.     Avoid strenuous activity for the first 24 hours. You may resume your regular activities after that.     You may shower, however avoid swimming, tub baths or hot tubs for 24 hours following your procedure    You may have a mild to moderate increase in pain for several days following the injection.      You may use ice packs for 10-15 minutes, 3 to 4 times a day at the injection site for comfort    Do not use heat to painful areas for 6 to 8 hours. This will give the local anesthetic time to wear off and prevent you from accidentally burning your skin.    You may use anti-inflammatory medications (such as Ibuprofen/Advil or Aleve) or Tylenol for pain control if necessary    With diabetes, check your blood sugar more frequently than usual as your blood sugar may be higher than normal for 10-14 days following a steroid injection. Contact your doctor who manages your diabetes if your blood sugar is higher than usual    It may take up to 14 days for the steroid medication to start working although you may feel the effect as early as a few days after the procedure.       If you experience any of the following, call the pain center line  during work hours at [phone] or on-call physician after hours at [phone]:  -Fever over 100 degree F  -Swelling, bleeding, redness, drainage, warmth at the injection site  -Progressive weakness or numbness in your legs   -Loss of bowel or bladder function  -Unusual headache that is not relieved by Tylenol or your regular headache medication  -Unusual new onset of pain that is not improving    Phone #s:  Nurse triage line for general questions: [phone]            Follow-ups after your visit        Your next 10 appointments already scheduled     Aug 02, 2017 10:30 AM CDT   SHORT with Susan Rachele Haase, APRN CNP   [facility] ([facility]    [address] [place] [id number] [phone]              Future tests that were ordered for you today     Open Future Orders        Priority Expected Expires Ordered    MA Diagnostic Bilateral w/Ángel Routine  5/2/2018 5/2/2017            Who to contact     If you have questions or need follow up information about today's clinic visit or your schedule please contact [facility] directly at [phone].  Normal or non-critical lab and imaging results will be communicated to you by Surefieldhart, letter or phone within 4 business days after the clinic has received the results. If you do not hear from us within 7 days, please contact the clinic through Surefieldhart or phone. If you have a critical or abnormal lab result, we will notify you by phone as soon as possible.  Submit refill requests through Zollo or call your pharmacy and they will forward the refill request to us. Please allow 3 business days for your refill to be completed.          Additional Information About Your Visit        Surefieldhart Information     Zollo gives you secure access to your electronic health record. If you see a primary care provider, you can also send messages to your care team and make appointments. If you have questions,  please call your primary care clinic.  If you do not have a primary care provider, please call [phone] and they will assist you.        Care EveryWhere ID     This is your Care EveryWhere ID. This could be used by other organizations to access your [facility] medical records  [id number]        Your Vitals Were     Pulse Pulse Oximetry                90 97%           Blood Pressure from Last 3 Encounters:   05/05/17 171/82   05/02/17 136/78   01/30/17 138/74    Weight from Last 3 Encounters:   05/02/17 81.6 kg (180 lb)   01/30/17 81.2 kg (179 lb)   11/04/16 78 kg (172 lb)              Today, you had the following     No orders found for display       Primary Care Provider Office Phone # Fax #    Susan Rachele Haase, APRN [phone]       [address] [address]        Thank you!     Thank you for choosing [facility]  for your care. Our goal is always to provide you with excellent care. Hearing back from our patients is one way we can continue to improve our services. Please take a few minutes to complete the written survey that you may receive in the mail after your visit with us. Thank you!             Your Updated Medication List - Protect others around you: Learn how to safely use, store and throw away your medicines at www.disposemymeds.org.          This list is accurate as of: 5/5/17 10:18 AM.  Always use your most recent med list.                   Brand Name Dispense Instructions for use    ACETAMINOPHEN PO      Take 500 mg by mouth       blood glucose monitoring lancets     1 Box    Use to test blood sugars 3 times daily or as directed.       blood glucose monitoring test strip    ONE TOUCH ULTRA    100 strip    Use to test blood sugar 3 times daily or as directed.       buPROPion 100 MG 12 hr tablet    BUDEPRION SR    180 tablet    Take 1 tablet (100 mg) by mouth 2 times daily       gabapentin 300 MG capsule    NEURONTIN     240 capsule    Take 2 capsules (600 mg) by mouth 4 times daily       glipiZIDE 10 MG 24 hr tablet    glipiZIDE XL    180 tablet    Take 1 tablet (10 mg) by mouth 2 times daily       insulin detemir 100 UNIT/ML injection    LEVEMIR    10.5 mL    Inject 38 Units Subcutaneous At Bedtime       insulin pen needle 32G X 4 MM    BD NILDA U/F    100 each    Use 1-2 daily or as directed with Levemir, FYI, PT HAS REFILLS AT [facility]       metFORMIN 1000 MG tablet    GLUCOPHAGE    180 tablet    Take 1 tablet (1,000 mg) by mouth 2 times daily (with meals)       metoprolol 100 MG tablet    LOPRESSOR    180 tablet    Take 1 tablet (100 mg) by mouth 2 times daily       MULTIVITAMIN & MINERAL PO      Take 1 tablet by mouth every evening       nystatin cream    MYCOSTATIN    60 g    Apply topically 3 times daily       OMEGA-3 FISH OIL PO      Take 1,200 mg by mouth every evening       omeprazole 20 MG CR capsule    priLOSEC    90 capsule    Take 1 capsule (20 mg) by mouth daily       QUEtiapine 50 MG tablet    SEROQUEL    90 tablet    Take 1 tablet (50 mg) by mouth At Bedtime       simvastatin 80 MG tablet    ZOCOR    45 tablet    Take 0.5 tablets (40 mg) by mouth At Bedtime       venlafaxine 150 MG 24 hr capsule    EFFEXOR-XR    90 capsule    Take 1 capsule (150 mg) by mouth daily With breakfast       VITAMIN D (CHOLECALCIFEROL) PO      Take 5,000 Units by mouth daily

## 2017-05-05 NOTE — NURSING NOTE
Discharge Information    IV Discontiued Time:  NA    Amount of Fluid Infused:  NA    Discharge Criteria = When patient returns to baseline or as per MD order    Consciousness:  Pt is fully awake    Circulation:  BP +/- 20% of pre-procedure level    Respiration:  Patient is able to breathe deeply    O2 Sat:  Patient is able to maintain O2 Sat >92% on room air    Activity:  Moves 4 extremities on command    Ambulation:  Patient is able to stand and walk    Dressing:  Clean/dry or No Dressing    Notes:   Discharge instructions and AVS given to patient    Patient meets criteria for discharge?  YES    Admitted to PCU?  No    Responsible adult present to accompany patient home?  Yes    Signature/Title:    Cristina Poole RN Care Coordinator  [facility]

## 2017-05-05 NOTE — PATIENT INSTRUCTIONS
[facility] Procedure Discharge Instructions    Today you saw:   Dr. Shayna Cloud         Your procedure:  Caudal epidural steroid injection      Medications used:  Lidocaine (anesthetic)    Kenalog (steroid)   Omnipaque (contrast)                  Be cautious when walking as numbness and/or weakness in the legs may occur up to 6-8 hours after the procedure due to effect of the local anesthetic    Do not drive for 6 hours. The effect of the local anesthetic could slow your reflexes.     Avoid strenuous activity for the first 24 hours. You may resume your regular activities after that.     You may shower, however avoid swimming, tub baths or hot tubs for 24 hours following your procedure    You may have a mild to moderate increase in pain for several days following the injection.      You may use ice packs for 10-15 minutes, 3 to 4 times a day at the injection site for comfort    Do not use heat to painful areas for 6 to 8 hours. This will give the local anesthetic time to wear off and prevent you from accidentally burning your skin.    You may use anti-inflammatory medications (such as Ibuprofen/Advil or Aleve) or Tylenol for pain control if necessary    With diabetes, check your blood sugar more frequently than usual as your blood sugar may be higher than normal for 10-14 days following a steroid injection. Contact your doctor who manages your diabetes if your blood sugar is higher than usual    It may take up to 14 days for the steroid medication to start working although you may feel the effect as early as a few days after the procedure.       If you experience any of the following, call the pain center line during work hours at [phone] or on-call physician after hours at [phone]:  -Fever over 100 degree F  -Swelling, bleeding, redness, drainage, warmth at the injection site  -Progressive weakness or numbness in your legs   -Loss of bowel or bladder function  -Unusual headache that is not  relieved by Tylenol or your regular headache medication  -Unusual new onset of pain that is not improving    Phone #s:  Nurse triage line for general questions: [phone]

## 2017-05-05 NOTE — PROGRESS NOTES
"[facility] - Procedure Note    Date of Visit: 05/05/17    Indications: Patricia Aden is a 71-year-old female who is seen at the referral of Susan Haase, APRN CNP for lumbar epidural steroid injection. She was last seen in clinic on 09/12/16, at which time she underwent bilateral L4-5 transforaminal epidural steroid injection. She reports immediate relief for the first several days, although notes that her pain gradually returned thereafter. She describes pain of the lumbosacral region with radiation into the bilateral buttocks/proximal posterior thighs. She reports low back pain with transitional movements, standing, walking, or bending over. She may occasionally experience symptoms below the knee. At baseline, she notes pain/paresthesias of the extremities (including variable/random sharp pains of the arms and legs) due to \"neuropathy\". She has a history of tripping/falls. She notes history of left knee surgery and hairline fracture of the femur. She reports generalized weakness, which she feels is related to lack of exercise. She notes history of urinary urgency associated with occasional incontinence. She notes occasional loss of stool associated with diarrhea.    Electronic Chart Review: Patient history, pertinent diagnostic studies, vitals, allergies, and medications were reviewed.    Review of Systems: The patient denies recent fever, chills, illness, use of antibiotics or anticoagulants. No allergy to local anesthetic, contrast dye, or steroid. Allergy to fish (\"cod\"), although denies allergy to shellfish or iodine.      Physical Examination:   /76  Pulse 90  SpO2 97%  GEN: Alert. Well developed, well nourished.   CV/Resp: Symmetric chest wall excursion. Non-labored breathing. No audible wheezing.  Skin: No rashes/lesions of posterior torso.   Extremities: Distal extremities warm, well perfused.  Neuro/MSK: Give-way/reduced endurance throughout bilateral lower extremities " (slightly more pronounced at left hip flexors).      Imagin16 MRI lumbar spine: Five lumbar vertebrae are assumed. Mild chronic compression deformity at T12. Degenerative disc disease changes at T12-L1. Schmorl's node formation at T11-T12. Grade 1 degenerative spondylolisthesis at L3-L4, L4-L5. Mild retrolisthesis at T12-L1. Findings by specific level: There is facet degenerative change as follows: Minimal right T12-L1 and L1-L2, minimal right and mild left L2-L3, moderate bilateral L3-L4, moderate to prominent bilateral L4-L5, moderate right and mild-moderate left L5-S1. T11-T12: No disc herniation or stenosis. T12-L1: Mild disc bulging without stenosis. L1-L2: No disc herniation or stenosis. L2-L3: Disc bulging without significant central stenosis. Mild right foraminal stenosis. The left neuroforamen appears adequate. L3-L4: Disc bulging. Slight ligamentum flavum thickening. Mild-moderate left and mild right foraminal stenosis. Borderline central stenosis. L4-L5: Disc bulging and ligamentum flavum thickening. Moderate central stenosis. Mild-moderate left and mild right foraminal stenosis. L5-S1: Disc bulging without stenosis. Impression: 1. Multilevel degenerative disc and facet disease. 2. L3-L4: Grade 1 spondylolisthesis. Borderline central stenosis. Mild-moderate left foraminal stenosis. 3. L4-L5: Grade 1 spondylolisthesis. Moderate central stenosis. Mild-moderate left foraminal stenosis.    Procedure Description:    Pre-procedure Diagnosis: Lumbar spondylolisthesis; Lumbar radiculitis/radiculopathy  Post-procedure Diagnosis: Lumbar spondylolisthesis; Lumbar radiculitis/radiculopathy  Procedure performed: Caudal epidural steroid injection  : Shayna Cloud MD    Local anesthetic: 2 mL 1% lidocaine (preservative free)  Medication solution (injectate): 2 mL of 40 mg/mL triamcinolone + 2.5 mL of 1% lidocaine + 1 mL of normal saline (preservative free)  Contrast: 0.75 mL of Omnipaque 300 used,  "9.25 mL discarded  Sterile prep/cleansing solution: ChloraPrep    The procedure and risks were explained, and informed written consent was obtained from the patient. Risks include but are not limited to: infection, bleeding, increased pain, and damage to soft tissue, nerve, muscle, and vasculature structures. The procedure site was marked using a disposable pen. Immediately prior to the start of the procedure, a \"time out\" safety check was conducted to confirm correct patient, procedure, and laterality. The patient's heart rate and oxygen saturation were monitored throughout the procedure.    The patient was positioned prone on the fluoroscopy table. The skin was prepped and draped in sterile fashion. The sacrum and sacral cornua were visualized in an AP view. Local anesthetic was injected subcutaneously over the needle entry site. A 25-gauge, 3.5 inch Quincke spinal needle was inserted and advanced into the sacral hiatus under fluoroscopic guidance. After the needle passed through the sacrococcygeal ligament, the needle angle was lowered, and the needle was advanced 2 cm. There was no evidence of paresthesias throughout needle placement. After negative aspiration for heme and cerebrospinal fluid, 0.75 mL of non-ionic contrast agent was slowly injected. Confirmation of spread of contrast agent within the caudal epidural space was made with fluoroscopic imaging in the AP and lateral views. Subsequently, the injectate was slowly administered without resistance. The stylet was reinserted and the needle was withdrawn.    The patient tolerated the procedure well, and there was no evidence of procedural complications. No new sensory or motor deficits were noted following the procedure. The patient was stable and able to ambulate on discharge home. Post-procedure instructions were provided.     Pre-procedure pain score: 8/10 back, 7/10 leg/buttock  Post-procedure pain score: 5/10 back, 5/10 leg/buttock    Assessment/Plan: " Patricia Aden is a 71-year-old female s/p caudal epidural steroid injection today.     1. Following today's procedure, the patient was advised to contact the [facility] for any of the following:   Fever, chills, or night sweats   New onset of pain, numbness, or weakness   Any questions/concerns regarding the procedure      If unable to contact the Pain Center, the patient was instructed to go to a local Emergency Room for any complications.   2. If only partial relief after 2-4 weeks, may consider repeating bilateral L4-5 transforaminal epidural steroid injection. Since the patient is not followed in the pain clinic, she is aware that she will need to see her primary care provider for evaluation/referral in the future.    Shayna Cloud MD  [facility]

## 2017-06-06 DIAGNOSIS — F33.1 MAJOR DEPRESSIVE DISORDER, RECURRENT EPISODE, MODERATE (H): ICD-10-CM

## 2017-06-06 NOTE — TELEPHONE ENCOUNTER
Quetiapine 50 mg tab      Last Written Prescription Date: 05/02/17  Last Fill Quantity: 90, # refills: 1  Last Office Visit with G, UMP or  Health prescribing provider: 05/02/17 Susan Haase  Next 5 appointments (look out 90 days)     Aug 02, 2017 10:30 AM CDT   SHORT with Susan Rachele Haase, APRN CNP   [facility]    [address] [place] [id number] [phone]                   BP Readings from Last 3 Encounters:   05/05/17 148/84   05/02/17 136/78   01/30/17 138/74     Pulse Readings from Last 2 Encounters:   05/05/17 86   05/02/17 70     Lab Results   Component Value Date     05/02/2017     Lab Results   Component Value Date    WBC 7.3 01/30/2017     Lab Results   Component Value Date    RBC 4.20 01/30/2017     Lab Results   Component Value Date    HGB 12.5 01/30/2017     Lab Results   Component Value Date    HCT 38.6 01/30/2017     No components found for: MCT  Lab Results   Component Value Date    MCV 92 01/30/2017     Lab Results   Component Value Date    MCH 29.8 01/30/2017     Lab Results   Component Value Date    MCHC 32.4 01/30/2017     Lab Results   Component Value Date    RDW 14.1 01/30/2017     Lab Results   Component Value Date     01/30/2017     Lab Results   Component Value Date    CHOL 154 01/30/2017     Lab Results   Component Value Date    HDL 41 01/30/2017     Lab Results   Component Value Date    LDL 84 01/30/2017     Lab Results   Component Value Date    TRIG 144 01/30/2017     Lab Results   Component Value Date    CHOLHDLRATIO 3.3 01/13/2015

## 2017-06-09 RX ORDER — QUETIAPINE FUMARATE 50 MG/1
50 TABLET, FILM COATED ORAL AT BEDTIME
Qty: 90 TABLET | Refills: 1 | OUTPATIENT
Start: 2017-06-09

## 2017-06-09 NOTE — TELEPHONE ENCOUNTER
Sent request back to pharmacy.     Duplicate. 6 month supply was sent on 5/2/17.    Leanne DE OLIVEIRA RN, BSN, PHN  [facility] RN

## 2017-06-13 ENCOUNTER — TELEPHONE (OUTPATIENT)
Dept: FAMILY MEDICINE | Facility: CLINIC | Age: 72
End: 2017-06-13

## 2017-06-13 NOTE — TELEPHONE ENCOUNTER
Panel Management Review      Patient has the following on her problem list:     Depression / Dysthymia review  PHQ-9 SCORE 7/27/2016 1/30/2017 5/2/2017   Total Score - - -   Total Score 18 17 16      Patient is due for:  PHQ9    Diabetes    ASA: Passed    Last A1C  Lab Results   Component Value Date    A1C 9.4 05/02/2017    A1C 11.1 01/30/2017    A1C 10.6 11/04/2016    A1C 8.2 07/27/2016    A1C 9.8 05/25/2016     A1C tested: Passed    Last LDL:    Lab Results   Component Value Date    CHOL 154 01/30/2017     Lab Results   Component Value Date    HDL 41 01/30/2017     Lab Results   Component Value Date    LDL 84 01/30/2017     Lab Results   Component Value Date    TRIG 144 01/30/2017     Lab Results   Component Value Date    CHOLHDLRATIO 3.3 01/13/2015     Lab Results   Component Value Date    NHDL 113 01/30/2017       Is the patient on a Statin? YES             Is the patient on Aspirin? YES    Medications     HMG CoA Reductase Inhibitors    simvastatin (ZOCOR) 80 MG tablet          Last three blood pressure readings:  BP Readings from Last 3 Encounters:   05/05/17 148/84   05/02/17 136/78   01/30/17 138/74       Date of last diabetes office visit: 5/5/2017     Tobacco History:     History   Smoking Status     Former Smoker     Years: 4.00     Types: Cigarettes   Smokeless Tobacco     Never Used     Comment: quit 1977         Hypertension   Last three blood pressure readings:  BP Readings from Last 3 Encounters:   05/05/17 148/84   05/02/17 136/78   01/30/17 138/74     Blood pressure: FAILED    HTN Guidelines:  Age 18-59 BP range:  Less than 140/90  Age 60-85 with Diabetes:  Less than 140/90  Age 60-85 without Diabetes:  less than 150/90      Composite cancer screening  Chart review shows that this patient is due/due soon for the following None  Summary:    Patient is due/failing the following:   BP CHECK and PHQ9    Action needed:   Patient needs to do PHQ9 after 6/30/2017. and Patient needs nurse only  appointment.    Type of outreach:    Sent Codekkot message.    Questions for provider review:    None                                                                                                                                    Nikky Reyes, [facility]       Chart routed to Care Team .

## 2017-07-08 ASSESSMENT — PATIENT HEALTH QUESTIONNAIRE - PHQ9: SUM OF ALL RESPONSES TO PHQ QUESTIONS 1-9: 20

## 2017-07-31 DIAGNOSIS — E11.40 TYPE 2 DIABETES MELLITUS WITH DIABETIC NEUROPATHY (H): ICD-10-CM

## 2017-07-31 NOTE — TELEPHONE ENCOUNTER
Pending Prescriptions:                       Disp   Refills    BD NILDA U/F 32G X 4 MM insulin pen needle*100 ea*0            Sig: USE 1 TO 2 DAILY OR AS DIRECTED WITH LEVEMIR              Last Written Prescription Date: 4/29/2017  Last Fill Quantity: 100each,  # refills: 0   Last Office Visit with [facility], [facility] or [facility] prescribing provider: 5/2/2017, Haase                                           Next 5 appointments (look out 90 days)     Aug 02, 2017 10:30 AM CDT   SHORT with Susan Rachele Haase, APRN [facility] ([facility]    [address] [place] [phone] [phone]

## 2017-08-01 RX ORDER — PEN NEEDLE, DIABETIC 32GX 5/32"
NEEDLE, DISPOSABLE MISCELLANEOUS
Qty: 100 EACH | Refills: 0 | Status: SHIPPED | OUTPATIENT
Start: 2017-08-01 | End: 2017-11-12

## 2017-08-01 NOTE — TELEPHONE ENCOUNTER
Pt calls, out of needles before visit with PCP later this week.   Prescription approved per [facility] Refill Protocol.  Fredo Lang RN

## 2017-08-04 ENCOUNTER — TELEPHONE (OUTPATIENT)
Dept: FAMILY MEDICINE | Facility: CLINIC | Age: 72
End: 2017-08-04

## 2017-08-04 NOTE — TELEPHONE ENCOUNTER
Received form for Knee Brace called pt to ask if she really wants this brace lm on pt vm to call back  Natalia Sahu/

## 2017-08-07 ENCOUNTER — OFFICE VISIT (OUTPATIENT)
Dept: FAMILY MEDICINE | Facility: CLINIC | Age: 72
End: 2017-08-07
Payer: COMMERCIAL

## 2017-08-07 VITALS
OXYGEN SATURATION: 97 % | WEIGHT: 173 LBS | RESPIRATION RATE: 14 BRPM | DIASTOLIC BLOOD PRESSURE: 70 MMHG | TEMPERATURE: 98 F | HEART RATE: 78 BPM | SYSTOLIC BLOOD PRESSURE: 124 MMHG | BODY MASS INDEX: 28.79 KG/M2

## 2017-08-07 DIAGNOSIS — E11.21 TYPE 2 DIABETES MELLITUS WITH DIABETIC NEPHROPATHY, WITH LONG-TERM CURRENT USE OF INSULIN (H): Primary | ICD-10-CM

## 2017-08-07 DIAGNOSIS — H61.23 BILATERAL IMPACTED CERUMEN: ICD-10-CM

## 2017-08-07 DIAGNOSIS — F41.9 ANXIETY: ICD-10-CM

## 2017-08-07 DIAGNOSIS — Z79.4 TYPE 2 DIABETES MELLITUS WITH DIABETIC NEPHROPATHY, WITH LONG-TERM CURRENT USE OF INSULIN (H): Primary | ICD-10-CM

## 2017-08-07 DIAGNOSIS — Z12.11 SPECIAL SCREENING FOR MALIGNANT NEOPLASMS, COLON: ICD-10-CM

## 2017-08-07 LAB
ANION GAP SERPL CALCULATED.3IONS-SCNC: 6 MMOL/L (ref 3–14)
BUN SERPL-MCNC: 10 MG/DL (ref 7–30)
CALCIUM SERPL-MCNC: 9.5 MG/DL (ref 8.5–10.1)
CHLORIDE SERPL-SCNC: 103 MMOL/L (ref 94–109)
CO2 SERPL-SCNC: 31 MMOL/L (ref 20–32)
CREAT SERPL-MCNC: 0.75 MG/DL (ref 0.52–1.04)
GFR SERPL CREATININE-BSD FRML MDRD: 76 ML/MIN/1.7M2
GLUCOSE SERPL-MCNC: 210 MG/DL (ref 70–99)
HBA1C MFR BLD: 9.7 % (ref 4.3–6)
POTASSIUM SERPL-SCNC: 4.5 MMOL/L (ref 3.4–5.3)
SODIUM SERPL-SCNC: 140 MMOL/L (ref 133–144)

## 2017-08-07 PROCEDURE — 99214 OFFICE O/P EST MOD 30 MIN: CPT | Mod: 25 | Performed by: NURSE PRACTITIONER

## 2017-08-07 PROCEDURE — 36415 COLL VENOUS BLD VENIPUNCTURE: CPT | Performed by: NURSE PRACTITIONER

## 2017-08-07 PROCEDURE — 69210 REMOVE IMPACTED EAR WAX UNI: CPT | Mod: 50 | Performed by: NURSE PRACTITIONER

## 2017-08-07 PROCEDURE — 80048 BASIC METABOLIC PNL TOTAL CA: CPT | Performed by: NURSE PRACTITIONER

## 2017-08-07 PROCEDURE — 83036 HEMOGLOBIN GLYCOSYLATED A1C: CPT | Performed by: NURSE PRACTITIONER

## 2017-08-07 ASSESSMENT — ANXIETY QUESTIONNAIRES
GAD7 TOTAL SCORE: 13
IF YOU CHECKED OFF ANY PROBLEMS ON THIS QUESTIONNAIRE, HOW DIFFICULT HAVE THESE PROBLEMS MADE IT FOR YOU TO DO YOUR WORK, TAKE CARE OF THINGS AT HOME, OR GET ALONG WITH OTHER PEOPLE: SOMEWHAT DIFFICULT
1. FEELING NERVOUS, ANXIOUS, OR ON EDGE: MORE THAN HALF THE DAYS
2. NOT BEING ABLE TO STOP OR CONTROL WORRYING: MORE THAN HALF THE DAYS
7. FEELING AFRAID AS IF SOMETHING AWFUL MIGHT HAPPEN: MORE THAN HALF THE DAYS
3. WORRYING TOO MUCH ABOUT DIFFERENT THINGS: MORE THAN HALF THE DAYS
5. BEING SO RESTLESS THAT IT IS HARD TO SIT STILL: SEVERAL DAYS
6. BECOMING EASILY ANNOYED OR IRRITABLE: MORE THAN HALF THE DAYS

## 2017-08-07 ASSESSMENT — PATIENT HEALTH QUESTIONNAIRE - PHQ9
5. POOR APPETITE OR OVEREATING: MORE THAN HALF THE DAYS
SUM OF ALL RESPONSES TO PHQ QUESTIONS 1-9: 17

## 2017-08-07 NOTE — PROGRESS NOTES
SUBJECTIVE:                                                    Patricia Aden is a 71 year old female who presents to clinic today for the following health issues:      Diabetes Follow-up      Patient is checking blood sugars: not at all    Diabetic concerns: None     Symptoms of hypoglycemia (low blood sugar): none     Paresthesias (numbness or burning in feet) or sores: Yes      Date of last diabetic eye exam: 12/2016    Amount of exercise or physical activity: None    Problems taking medications regularly: No    Medication side effects: none    Diet: regular (no restrictions)    Patient is not checking blood sugars at home. She reports cutting down on eating, but has not noticed a significant weight loss. Also, patient reports cravings for junk food, but when she indulges she is not satisfied afterwards. Insulin was increased to 38 units and patient notes she is tolerating this well.    Lab Results   Component Value Date    A1C 9.7 08/07/2017    A1C 9.4 05/02/2017    A1C 11.1 01/30/2017    A1C 10.6 11/04/2016    A1C 8.2 07/27/2016     Wt Readings from Last 3 Encounters:   08/07/17 78.5 kg (173 lb)   05/02/17 81.6 kg (180 lb)   01/30/17 81.2 kg (179 lb)       Anxiety: Patient reports increased anxiety related to moving to [facility] soon. She notes she wants to be closer to her oldest son in the city.     Problem list and histories reviewed & adjusted, as indicated.  Additional history: as documented    Patient Active Problem List   Diagnosis     Hyperlipidemia LDL goal <100     Osteoporosis     Advanced directives, counseling/discussion     Urgency incontinence     Anxiety     Health Care Home     Status post revision of total hip     Type 2 diabetes mellitus with diabetic nephropathy (H)     Microalbuminuria     Status post total knee replacement     Insulin-requiring or dependent type II diabetes mellitus (H)     Pneumonia, organism     Constipation     Physical deconditioning     Type 2 diabetes  mellitus with diabetic neuropathy (H)     Hip pain     Other specified hypothyroidism     Thyroid nodule     Hypoxia     Closed fracture of neck of right femur with routine healing, subsequent encounter     Encounter for long-term (current) use of insulin (H)     Essential hypertension with goal blood pressure less than 140/90     ASCUS with positive high risk HPV cervical     Major depressive disorder, recurrent episode, moderate (H)     Lumbar radicular pain     Past Surgical History:   Procedure Laterality Date     ARTHROPLASTY HIP BILATERAL       ARTHROPLASTY KNEE Left 12/8/2015    Procedure: ARTHROPLASTY KNEE;  Surgeon: Huber Delgado MD;  Location: [facility] OR     ARTHROPLASTY REVISION HIP  7/9/2014    Procedure: ARTHROPLASTY REVISION HIP;  Surgeon: Huber Delgado MD;  Location: [facility] OR     CHOLECYSTECTOMY       COLONOSCOPY       ORTHOPEDIC SURGERY         Social History   Substance Use Topics     Smoking status: Former Smoker     Years: 4.00     Types: Cigarettes     Smokeless tobacco: Never Used      Comment: quit 1977     Alcohol use No     Family History   Problem Relation Age of Onset     DIABETES Mother      CANCER Mother      HEART DISEASE Father      DIABETES Maternal Grandmother          Current Outpatient Prescriptions   Medication Sig Dispense Refill     insulin detemir (LEVEMIR) 100 UNIT/ML injection Inject 41 Units Subcutaneous At Bedtime 10.5 mL 3     BD NILDA U/F 32G X 4 MM insulin pen needle USE 1 TO 2 DAILY OR AS DIRECTED WITH LEVEMIR 100 each 0     simvastatin (ZOCOR) 80 MG tablet Take 0.5 tablets (40 mg) by mouth At Bedtime 45 tablet 3     metoprolol (LOPRESSOR) 100 MG tablet Take 1 tablet (100 mg) by mouth 2 times daily 180 tablet 3     metFORMIN (GLUCOPHAGE) 1000 MG tablet Take 1 tablet (1,000 mg) by mouth 2 times daily (with meals) 180 tablet 2     buPROPion (BUDEPRION SR) 100 MG 12 hr tablet Take 1 tablet (100 mg) by mouth 2 times daily 180 tablet 1     omeprazole (PRILOSEC) 20 MG CR  capsule Take 1 capsule (20 mg) by mouth daily 90 capsule 1     venlafaxine (EFFEXOR-XR) 150 MG 24 hr capsule Take 1 capsule (150 mg) by mouth daily With breakfast 90 capsule 1     QUEtiapine (SEROQUEL) 50 MG tablet Take 1 tablet (50 mg) by mouth At Bedtime 90 tablet 1     glipiZIDE (GLIPIZIDE XL) 10 MG 24 hr tablet Take 1 tablet (10 mg) by mouth 2 times daily 180 tablet 1     gabapentin (NEURONTIN) 300 MG capsule Take 2 capsules (600 mg) by mouth 4 times daily 240 capsule 1     [DISCONTINUED] insulin detemir (LEVEMIR) 100 UNIT/ML injection Inject 38 Units Subcutaneous At Bedtime 10.5 mL 3     ACETAMINOPHEN PO Take 500 mg by mouth       nystatin (MYCOSTATIN) cream Apply topically 3 times daily 60 g 0     blood glucose monitoring (ONE TOUCH DELICA) lancets Use to test blood sugars 3 times daily or as directed. 1 Box prn     blood glucose (ONE TOUCH ULTRA) test strip Use to test blood sugar 3 times daily or as directed. 100 strip 3     Omega-3 Fatty Acids (OMEGA-3 FISH OIL PO) Take 1,200 mg by mouth every evening        VITAMIN D, CHOLECALCIFEROL, PO Take 5,000 Units by mouth daily        Multiple Vitamins-Minerals (MULTIVITAMIN & MINERAL PO) Take 1 tablet by mouth every evening        Allergies   Allergen Reactions     Fish Allergy Anaphylaxis     [place] roughy-anaphylaxsis     Penicillins Unknown         Reviewed and updated as needed this visit by clinical staff     Reviewed and updated as needed this visit by Provider         ROS:  Constitutional, HEENT, cardiovascular, pulmonary, GI, , musculoskeletal, neuro, skin, endocrine and psych systems are negative, except as otherwise noted.    This document serves as a record of the services and decisions personally performed and made by Susan Haase, CNP. It was created on her behalf by Nadya Coto, a trained medical scribe. The creation of this document is based on the provider's statements to the medical scribe.  Nadya Coto 11:46 AM August 7, 2017    OBJECTIVE:   BP  124/70 (BP Location: Right arm, Patient Position: Chair, Cuff Size: Adult Regular)  Pulse 78  Temp 98  F (36.7  C) (Oral)  Resp 14  Wt 78.5 kg (173 lb)  SpO2 97%  BMI 28.79 kg/m2  Body mass index is 28.79 kg/(m^2).  GENERAL: healthy, alert and no distress  HENT: TM's normal, bilateral cerumen impaction, nose and mouth without ulcers or lesions  NECK: no adenopathy, no asymmetry, masses, or scars and thyroid normal to palpation  RESP: lungs clear to auscultation - no rales, rhonchi or wheezes  CV: regular rate and rhythm, normal S1 S2, no S3 or S4, no murmur, click or rub, no peripheral edema and peripheral pulses strong  NEURO: Normal strength and tone, mentation intact and speech normal  PSYCH: mentation appears normal, affect normal/bright    ASSESSMENT/PLAN:   Patricia was seen today for diabetes.    Diagnoses and all orders for this visit:    Type 2 diabetes mellitus with diabetic nephropathy, with long-term current use of insulin (H):  A1C increased to 9.7%, will increase levemir to 41 units, discussed dietary changes.  -     Hemoglobin A1c  -     Basic metabolic panel  -     insulin detemir (LEVEMIR) 100 UNIT/ML injection; Inject 41 Units Subcutaneous At Bedtime    Anxiety:  Seeing a counselor on a regular basis.     Special screening for malignant neoplasms, colon  -     Fecal colorectal cancer screen (FIT); Future    Bilateral impacted cerumen  -     REMOVE IMPACTED CERUMEN    Cerumen impaction:  Using warm tap water and curette cerumen removed from bilateral external ears, after removal TM intact without erythema.    The information in this document, created by the medical scribe for me, accurately reflects the services I personally performed and the decisions made by me. I have reviewed and approved this document for accuracy prior to leaving the patient care area.  August 7, 2017 11:46 AM  Follow up in 3 months, sooner as needed.   Susan Haase, APRN [facility]

## 2017-08-07 NOTE — NURSING NOTE
"Chief Complaint   Patient presents with     Diabetes       Initial /70 (BP Location: Right arm, Patient Position: Chair, Cuff Size: Adult Regular)  Pulse 78  Temp 98  F (36.7  C) (Oral)  Resp 14  Wt 173 lb (78.5 kg)  SpO2 97%  BMI 28.79 kg/m2 Estimated body mass index is 28.79 kg/(m^2) as calculated from the following:    Height as of 5/2/17: 5' 5\" (1.651 m).    Weight as of this encounter: 173 lb (78.5 kg).  Medication Reconciliation: complete   Nikky Reyes CMA      "

## 2017-08-07 NOTE — MR AVS SNAPSHOT
After Visit Summary   8/7/2017    Patricia Aden    MRN: [id number]           Patient Information     Date Of Birth          1945        Visit Information        Provider Department      8/7/2017 10:30 AM Haase, Susan Rachele, APRN CNP [facility]        Today's Diagnoses     Type 2 diabetes mellitus with diabetic nephropathy, with long-term current use of insulin (H)    -  1    Special screening for malignant neoplasms, colon           Follow-ups after your visit        Follow-up notes from your care team     Return in about 3 months (around 11/7/2017).      Future tests that were ordered for you today     Open Future Orders        Priority Expected Expires Ordered    Fecal colorectal cancer screen (FIT) Routine 8/28/2017 10/30/2017 8/7/2017            Who to contact     If you have questions or need follow up information about today's clinic visit or your schedule please contact [facility] directly at [phone].  Normal or non-critical lab and imaging results will be communicated to you by [employer], letter or phone within 4 business days after the clinic has received the results. If you do not hear from us within 7 days, please contact the clinic through [employer] or phone. If you have a critical or abnormal lab result, we will notify you by phone as soon as possible.  Submit refill requests through [employer] or call your pharmacy and they will forward the refill request to us. Please allow 3 business days for your refill to be completed.          Additional Information About Your Visit        MyChart Information     [employer] gives you secure access to your electronic health record. If you see a primary care provider, you can also send messages to your care team and make appointments. If you have questions, please call your primary care clinic.  If you do not have a primary care provider, please call [phone] and they will assist you.        Care EveryWhere  ID     This is your Care EveryWhere ID. This could be used by other organizations to access your [facility] medical records  [id number]        Your Vitals Were     Pulse Temperature Respirations Pulse Oximetry BMI (Body Mass Index)       78 98  F (36.7  C) (Oral) 14 97% 28.79 kg/m2        Blood Pressure from Last 3 Encounters:   08/07/17 124/70   05/05/17 148/84   05/02/17 136/78    Weight from Last 3 Encounters:   08/07/17 173 lb (78.5 kg)   05/02/17 180 lb (81.6 kg)   01/30/17 179 lb (81.2 kg)              We Performed the Following     Basic metabolic panel     Hemoglobin A1c          Today's Medication Changes          These changes are accurate as of: 8/7/17 11:57 AM.  If you have any questions, ask your nurse or doctor.               These medicines have changed or have updated prescriptions.        Dose/Directions    insulin detemir 100 UNIT/ML injection   Commonly known as:  LEVEMIR   This may have changed:  how much to take   Used for:  Type 2 diabetes mellitus with diabetic nephropathy, with long-term current use of insulin (H)        Dose:  41 Units   Inject 41 Units Subcutaneous At Bedtime   Quantity:  10.5 mL   Refills:  3            Where to get your medicines      [facility] [address] [place] [address] AT [facility] [address]  [address] [facility] [address]     Phone:  [phone]     insulin detemir 100 UNIT/ML injection                Primary Care Provider Office Phone # Fax #    Susan Rachele Haase, APRN [phone]       [address] [address]        Equal Access to Services     GIO DAWKINS AH: Hadii trev Lou, waaxda luqadaha, qaybta kaalmada adeegyada, joey arias. So [facility] [phone].    ATENCIÓN: Si habla español, tiene a terrell disposición servicios gratuitos de asistencia lingüística. Llame al [phone].    We  comply with applicable federal civil rights laws and [place] laws. We do not discriminate on the basis of race, color, national origin, age, disability sex, sexual orientation or gender identity.            Thank you!     Thank you for choosing [facility]  for your care. Our goal is always to provide you with excellent care. Hearing back from our patients is one way we can continue to improve our services. Please take a few minutes to complete the written survey that you may receive in the mail after your visit with us. Thank you!             Your Updated Medication List - Protect others around you: Learn how to safely use, store and throw away your medicines at www.disposemymeds.org.          This list is accurate as of: 8/7/17 11:57 AM.  Always use your most recent med list.                   Brand Name Dispense Instructions for use Diagnosis    ACETAMINOPHEN PO      Take 500 mg by mouth        BD NILDA U/F 32G X 4 MM   Generic drug:  insulin pen needle     100 each    USE 1 TO 2 DAILY OR AS DIRECTED WITH LEVEMIR    Type 2 diabetes mellitus with diabetic neuropathy (H)       blood glucose monitoring lancets     1 Box    Use to test blood sugars 3 times daily or as directed.    Type 2 diabetes, HbA1C goal < 8% (H)       blood glucose monitoring test strip    ONE TOUCH ULTRA    100 strip    Use to test blood sugar 3 times daily or as directed.    Type 2 diabetes, HbA1C goal < 8% (H)       buPROPion 100 MG 12 hr tablet    BUDEPRION SR    180 tablet    Take 1 tablet (100 mg) by mouth 2 times daily    Major depressive disorder, recurrent episode, moderate (H)       gabapentin 300 MG capsule    NEURONTIN    240 capsule    Take 2 capsules (600 mg) by mouth 4 times daily    Other diabetic neurological complication associated with other specified diabetes mellitus (H)       glipiZIDE 10 MG 24 hr tablet    glipiZIDE XL    180 tablet    Take 1 tablet (10 mg) by mouth 2 times daily    Type 2 diabetes  mellitus with diabetic nephropathy, with long-term current use of insulin (H)       insulin detemir 100 UNIT/ML injection    LEVEMIR    10.5 mL    Inject 41 Units Subcutaneous At Bedtime    Type 2 diabetes mellitus with diabetic nephropathy, with long-term current use of insulin (H)       metFORMIN 1000 MG tablet    GLUCOPHAGE    180 tablet    Take 1 tablet (1,000 mg) by mouth 2 times daily (with meals)    Type 2 diabetes mellitus with diabetic nephropathy, with long-term current use of insulin (H)       metoprolol 100 MG tablet    LOPRESSOR    180 tablet    Take 1 tablet (100 mg) by mouth 2 times daily    Essential hypertension       MULTIVITAMIN & MINERAL PO      Take 1 tablet by mouth every evening        nystatin cream    MYCOSTATIN    60 g    Apply topically 3 times daily    Candidiasis of skin       OMEGA-3 FISH OIL PO      Take 1,200 mg by mouth every evening        omeprazole 20 MG CR capsule    priLOSEC    90 capsule    Take 1 capsule (20 mg) by mouth daily    Gastroesophageal reflux disease with esophagitis       QUEtiapine 50 MG tablet    SEROQUEL    90 tablet    Take 1 tablet (50 mg) by mouth At Bedtime    Major depressive disorder, recurrent episode, moderate (H)       simvastatin 80 MG tablet    ZOCOR    45 tablet    Take 0.5 tablets (40 mg) by mouth At Bedtime    Hyperlipidemia LDL goal <100       venlafaxine 150 MG 24 hr capsule    EFFEXOR-XR    90 capsule    Take 1 capsule (150 mg) by mouth daily With breakfast    Major depressive disorder, recurrent episode, moderate (H)       VITAMIN D (CHOLECALCIFEROL) PO      Take 5,000 Units by mouth daily

## 2017-08-08 ASSESSMENT — ANXIETY QUESTIONNAIRES: GAD7 TOTAL SCORE: 13

## 2017-08-10 PROCEDURE — G0328 FECAL BLOOD SCRN IMMUNOASSAY: HCPCS | Performed by: NURSE PRACTITIONER

## 2017-08-14 DIAGNOSIS — Z12.11 SPECIAL SCREENING FOR MALIGNANT NEOPLASMS, COLON: ICD-10-CM

## 2017-08-14 LAB — HEMOCCULT STL QL IA: NEGATIVE

## 2017-08-15 NOTE — PROGRESS NOTES
Sid Gomez,  Thank you for returning your stool test.  It was negative.  We suggest that this test be completed every year.  Please let me know if you have any questions.  Sincerely,  Susan Haase, CNP

## 2017-08-29 DIAGNOSIS — E13.49 OTHER DIABETIC NEUROLOGICAL COMPLICATION ASSOCIATED WITH OTHER SPECIFIED DIABETES MELLITUS (H): ICD-10-CM

## 2017-08-29 RX ORDER — GABAPENTIN 300 MG/1
CAPSULE ORAL
Qty: 240 CAPSULE | Refills: 0 | Status: SHIPPED | OUTPATIENT
Start: 2017-08-29 | End: 2017-10-19

## 2017-08-29 NOTE — TELEPHONE ENCOUNTER
gabapentin (NEURONTIN) 300 MG capsule    Sig: Take 2 capsules (600 mg) by mouth 4 times daily      Last Written Prescription Date: 5/2/17  Last Quantity: 240, # refills: 1  Last Office Visit with [facility] or  [employer] prescribing provider: 8/7/2017       Creatinine   Date Value Ref Range Status   08/07/2017 0.75 0.52 - 1.04 mg/dL Final     Lab Results   Component Value Date    AST 64 01/30/2017     Lab Results   Component Value Date    ALT 89 01/30/2017     BP Readings from Last 3 Encounters:   08/07/17 124/70   05/05/17 148/84   05/02/17 136/78     Associated Diagnoses   Other diabetic neurological complication associated with other specified diabetes mellitus (H) [E13.49]             Routing refill request to provider for review/approval because:  Drug not on the [facility], [facility] or  [employer] refill protocol or controlled substance    RYAN Godoy  August 29, 2017  1:31 PM

## 2017-09-15 NOTE — TELEPHONE ENCOUNTER
Received another form for the knee brace - called patient and YES this is a valid order - gave to Gracie to review and sign

## 2017-09-29 ENCOUNTER — TELEPHONE (OUTPATIENT)
Dept: FAMILY MEDICINE | Facility: CLINIC | Age: 72
End: 2017-09-29

## 2017-09-29 NOTE — TELEPHONE ENCOUNTER
Patient calling and states she moved to [facility] and wondering if you know of any good primary providers in her area?  She is thinking [facility]?  Please advise.  Call her back at [phone].  Clover Cotton RN

## 2017-09-30 ENCOUNTER — HEALTH MAINTENANCE LETTER (OUTPATIENT)
Age: 72
End: 2017-09-30

## 2017-10-02 NOTE — TELEPHONE ENCOUNTER
Can you let Patricia know that I would recommend [facility] on [address],she can call [phone]. If she would like to stay with a female provider I would recommend [facility].    Please let her know we will miss her!  Thanks,  Susan Haase, CNP

## 2017-10-19 DIAGNOSIS — E13.49 OTHER DIABETIC NEUROLOGICAL COMPLICATION ASSOCIATED WITH OTHER SPECIFIED DIABETES MELLITUS (H): ICD-10-CM

## 2017-10-19 RX ORDER — GABAPENTIN 300 MG/1
CAPSULE ORAL
Qty: 240 CAPSULE | Refills: 0 | Status: SHIPPED | OUTPATIENT
Start: 2017-10-19 | End: 2017-11-28

## 2017-10-19 NOTE — TELEPHONE ENCOUNTER
gabapentin (NEURONTIN) 300 MG capsule      Sig: TAKE 2 CAPSULES(600 MG) BY MOUTH FOUR TIMES DAILY    Last Written Prescription Date: 8/29/17  Last Quantity: 240, # refills: 0  Last Office Visit with [facility] prescribing provider: 8/7/2017  Next 5 appointments (look out 90 days)     Oct 26, 2017  4:00 PM CDT   [facility]    [address] [facility] [id number] [phone]                   Creatinine   Date Value Ref Range Status   08/07/2017 0.75 0.52 - 1.04 mg/dL Final     Lab Results   Component Value Date    AST 64 01/30/2017     Lab Results   Component Value Date    ALT 89 01/30/2017     BP Readings from Last 3 Encounters:   08/07/17 124/70   05/05/17 148/84   05/02/17 136/78     Associated Diagnoses   Other diabetic neurological complication associated with other specified diabetes mellitus (H) [E13.49]           From 9/29/17 Encounter:  Clover Cotton RN     9/29/17 4:06 PM   Note      Patient calling and states she moved to [facility] and wondering if you know of any good primary providers in her area?  She is thinking [facility]?  Please advise.  Call her back at [phone].  Clover Cotton RN                   Routing refill request to provider for review/approval because:  Drug not on the [facility] refill protocol or controlled substance    RYAN Godoy  October 19, 2017  1:04 PM

## 2017-10-19 NOTE — TELEPHONE ENCOUNTER
Routing refill request to provider for review/approval because:  Drug not on the [facility] refill protocol   Needs recommendations for PCP in [facility]    Shelby Macario RN, BS  Clinical Nurse Triage.

## 2017-10-26 ENCOUNTER — OFFICE VISIT (OUTPATIENT)
Dept: FAMILY MEDICINE | Facility: CLINIC | Age: 72
End: 2017-10-26
Payer: COMMERCIAL

## 2017-10-26 VITALS
SYSTOLIC BLOOD PRESSURE: 151 MMHG | DIASTOLIC BLOOD PRESSURE: 94 MMHG | HEIGHT: 65 IN | HEART RATE: 117 BPM | TEMPERATURE: 98.3 F | OXYGEN SATURATION: 96 % | BODY MASS INDEX: 28.32 KG/M2 | WEIGHT: 170 LBS

## 2017-10-26 DIAGNOSIS — E04.1 THYROID NODULE: ICD-10-CM

## 2017-10-26 DIAGNOSIS — E78.5 HYPERLIPIDEMIA LDL GOAL <100: ICD-10-CM

## 2017-10-26 DIAGNOSIS — Z23 NEED FOR PROPHYLACTIC VACCINATION WITH TETANUS-DIPHTHERIA (TD): ICD-10-CM

## 2017-10-26 DIAGNOSIS — M54.16 LUMBAR RADICULAR PAIN: ICD-10-CM

## 2017-10-26 DIAGNOSIS — Z12.4 SCREENING FOR MALIGNANT NEOPLASM OF CERVIX: ICD-10-CM

## 2017-10-26 DIAGNOSIS — F33.1 MAJOR DEPRESSIVE DISORDER, RECURRENT EPISODE, MODERATE (H): ICD-10-CM

## 2017-10-26 DIAGNOSIS — I10 ESSENTIAL HYPERTENSION WITH GOAL BLOOD PRESSURE LESS THAN 140/90: ICD-10-CM

## 2017-10-26 DIAGNOSIS — F41.9 ANXIETY: ICD-10-CM

## 2017-10-26 DIAGNOSIS — R80.9 MICROALBUMINURIA: ICD-10-CM

## 2017-10-26 DIAGNOSIS — E11.21 TYPE 2 DIABETES MELLITUS WITH DIABETIC NEPHROPATHY, WITH LONG-TERM CURRENT USE OF INSULIN (H): Primary | ICD-10-CM

## 2017-10-26 DIAGNOSIS — Z23 NEED FOR PROPHYLACTIC VACCINATION AND INOCULATION AGAINST INFLUENZA: ICD-10-CM

## 2017-10-26 DIAGNOSIS — Z79.4 TYPE 2 DIABETES MELLITUS WITH DIABETIC NEPHROPATHY, WITH LONG-TERM CURRENT USE OF INSULIN (H): Primary | ICD-10-CM

## 2017-10-26 LAB — HBA1C MFR BLD: 9.3 % (ref 4.3–6)

## 2017-10-26 PROCEDURE — G0008 ADMIN INFLUENZA VIRUS VAC: HCPCS | Performed by: INTERNAL MEDICINE

## 2017-10-26 PROCEDURE — 90662 IIV NO PRSV INCREASED AG IM: CPT | Performed by: INTERNAL MEDICINE

## 2017-10-26 PROCEDURE — 83036 HEMOGLOBIN GLYCOSYLATED A1C: CPT | Performed by: INTERNAL MEDICINE

## 2017-10-26 PROCEDURE — 99213 OFFICE O/P EST LOW 20 MIN: CPT | Mod: 25 | Performed by: INTERNAL MEDICINE

## 2017-10-26 PROCEDURE — 36415 COLL VENOUS BLD VENIPUNCTURE: CPT | Performed by: INTERNAL MEDICINE

## 2017-10-26 ASSESSMENT — PATIENT HEALTH QUESTIONNAIRE - PHQ9: SUM OF ALL RESPONSES TO PHQ QUESTIONS 1-9: 16

## 2017-10-26 NOTE — PROGRESS NOTES
SUBJECTIVE:   Patricia Aden is a 72 year old female who presents to clinic today for the following health issues:      New Patient/Transfer of Care  Diabetes Follow-up      Patient is checking blood sugars: rarely.  Results range from 150 to 170    Diabetic concerns: None     Symptoms of hypoglycemia (low blood sugar): none     Paresthesias (numbness or burning in feet) or sores: Yes numbness     Date of last diabetic eye exam: First part of 2017    Hyperlipidemia Follow-Up      Rate your low fat/cholesterol diet?: good    Taking statin?  Yes, no muscle aches from statin    Other lipid medications/supplements?:  none    Hypertension Follow-up      Outpatient blood pressures are not being checked.    Low Salt Diet: no added salt    Amount of exercise or physical activity: None    Problems taking medications regularly: No    Medication side effects: none    Diet: regular (no restrictions)            Problem list and histories reviewed & adjusted, as indicated.  Additional history: as documented    Current Outpatient Medications   Medication Sig Dispense Refill     ACETAMINOPHEN PO Take 500 mg by mouth as needed        blood glucose (ONE TOUCH ULTRA) test strip Use to test blood sugar 3 times daily or as directed. 100 strip 3     blood glucose monitoring (ONE TOUCH DELICA) lancets Use to test blood sugars 3 times daily or as directed. 1 Box prn     Multiple Vitamins-Minerals (MULTIVITAMIN & MINERAL PO) Take 1 tablet by mouth every evening        Omega-3 Fatty Acids (OMEGA-3 FISH OIL PO) Take 1,200 mg by mouth every evening        BD NILDA U/F 32G X 4 MM insulin pen needle USE ONE OR TWO DAILY OR AS DIRECTED WITH LEVEMIR. 100 each 0     blood glucose monitoring (NO BRAND SPECIFIED) test strip Use to test blood sugars 4 times daily or as directed 400 strip 3     buPROPion (WELLBUTRIN SR) 100 MG 12 hr tablet TAKE 1 TABLET(100 MG) BY MOUTH TWICE DAILY 180 tablet 0     gabapentin (NEURONTIN) 300 MG capsule TAKE 2  "CAPSULES(600 MG) BY MOUTH FOUR TIMES DAILY (Patient taking differently: Take 600 mg by mouth 3 times daily TAKE 2 CAPSULES(600 MG) BY MOUTH FOUR TIMES DAILY) 240 capsule 3     glipiZIDE (GLUCOTROL XL) 10 MG 24 hr tablet TAKE 1 TABLET(10 MG) BY MOUTH TWICE DAILY 180 tablet 1     IBUPROFEN PO Take 200 mg by mouth as needed for moderate pain       insulin detemir (LEVEMIR FLEXTOUCH) 100 UNIT/ML injection INJECT 41 UNITS UNDER THE SKIN EVERY NIGHT AT BEDTIME. 15 mL 2     [employer] [facility] FINEPOINT LANCETS MISC Use to test blood sugars 4 times daily or as directed. 400 each 3     metFORMIN (GLUCOPHAGE) 1000 MG tablet TAKE 1 TABLET(1000 MG) BY MOUTH TWICE DAILY WITH MEALS 180 tablet 0     metoprolol tartrate (LOPRESSOR) 100 MG tablet TAKE 1 TABLET BY MOUTH TWICE DAILY 180 tablet 1     omeprazole (PRILOSEC) 20 MG CR capsule TAKE ONE CAPSULE BY MOUTH DAILY 90 capsule 3     order for DME Equipment being ordered: 4 wheeled walker w/ seat 1 Units 1     QUEtiapine (SEROQUEL) 50 MG tablet TAKE 1 TABLET(50 MG) BY MOUTH AT BEDTIME 30 tablet 0     simvastatin (ZOCOR) 80 MG tablet TAKE 1/2 (ONE-HALF) TABLET BY MOUTH AT BEDTIME 45 tablet 1     venlafaxine (EFFEXOR-XR) 150 MG 24 hr capsule TAKE 1 CAPSULE(150 MG) BY MOUTH DAILY WITH BREAKFAST 90 capsule 3     Allergies   Allergen Reactions     Fish Allergy Anaphylaxis     [place] roughy-anaphylaxsis     Penicillins Unknown       Reviewed and updated as needed this visit by clinical staffTobacco  Allergies  Soc Hx      Reviewed and updated as needed this visit by Provider         ROS:  Constitutional, HEENT, cardiovascular, pulmonary, gi and gu systems are negative, except as otherwise noted.    OBJECTIVE:     BP (!) 151/94 (BP Location: Left arm, Patient Position: Sitting, Cuff Size: Adult Regular)   Pulse 117   Temp 98.3  F (36.8  C) (Oral)   Ht 1.651 m (5' 5\")   Wt 77.1 kg (170 lb)   SpO2 96%   Breastfeeding? No   BMI 28.29 kg/m    Body mass index is 28.29 kg/m .  GENERAL: healthy, " alert and no distress  NECK: no adenopathy, no asymmetry, masses, or scars and thyroid normal to palpation  RESP: lungs clear to auscultation - no rales, rhonchi or wheezes  CV: regular rate and rhythm, normal S1 S2, no S3 or S4, no murmur, click or rub, no peripheral edema and peripheral pulses strong  ABDOMEN: soft, nontender, no hepatosplenomegaly, no masses and bowel sounds normal  MS: no gross musculoskeletal defects noted, no edema        ASSESSMENT/PLAN:             1. Type 2 diabetes mellitus with diabetic nephropathy, with long-term current use of insulin (H)    - Hemoglobin A1c    2. Major depressive disorder, recurrent episode, moderate (H)      3. Hyperlipidemia LDL goal <100      4. Anxiety      5. Microalbuminuria      6. Thyroid nodule      7. Essential hypertension with goal blood pressure less than 140/90      8. Lumbar radicular pain      9. Screening for malignant neoplasm of cervix      10. Need for prophylactic vaccination and inoculation against influenza    - FLU VACCINE, INCREASED ANTIGEN, PRESV FREE, AGE 65+ [43715]  - Vaccine Administration, Initial [16249]    11. Need for prophylactic vaccination with tetanus-diphtheria (TD)            Iglesia Moreno MD  [facility]    Injectable Influenza Immunization Documentation    1.  Is the person to be vaccinated sick today?   No    2. Does the person to be vaccinated have an allergy to a component   of the vaccine?   No  Egg Allergy Algorithm Link    3. Has the person to be vaccinated ever had a serious reaction   to influenza vaccine in the past?   No    4. Has the person to be vaccinated ever had Guillain-Barré syndrome?   No    Form completed by Gisell Fuentes CMA  Prior to injection verified patient identity using patient's name and date of birth.

## 2017-11-09 ENCOUNTER — OFFICE VISIT (OUTPATIENT)
Dept: FAMILY MEDICINE | Facility: CLINIC | Age: 72
End: 2017-11-09
Payer: COMMERCIAL

## 2017-11-09 VITALS
SYSTOLIC BLOOD PRESSURE: 135 MMHG | OXYGEN SATURATION: 97 % | HEIGHT: 65 IN | TEMPERATURE: 97.2 F | WEIGHT: 171 LBS | DIASTOLIC BLOOD PRESSURE: 71 MMHG | HEART RATE: 71 BPM | BODY MASS INDEX: 28.49 KG/M2

## 2017-11-09 DIAGNOSIS — N39.41 URGENCY INCONTINENCE: ICD-10-CM

## 2017-11-09 DIAGNOSIS — F33.1 MAJOR DEPRESSIVE DISORDER, RECURRENT EPISODE, MODERATE (H): ICD-10-CM

## 2017-11-09 DIAGNOSIS — E11.40 TYPE 2 DIABETES MELLITUS WITH DIABETIC NEUROPATHY, WITH LONG-TERM CURRENT USE OF INSULIN (H): ICD-10-CM

## 2017-11-09 DIAGNOSIS — M81.0 AGE-RELATED OSTEOPOROSIS WITHOUT CURRENT PATHOLOGICAL FRACTURE: ICD-10-CM

## 2017-11-09 DIAGNOSIS — F41.9 ANXIETY: ICD-10-CM

## 2017-11-09 DIAGNOSIS — Z79.4 TYPE 2 DIABETES MELLITUS WITH DIABETIC NEUROPATHY, WITH LONG-TERM CURRENT USE OF INSULIN (H): ICD-10-CM

## 2017-11-09 DIAGNOSIS — M19.011 PRIMARY OSTEOARTHRITIS OF RIGHT SHOULDER: ICD-10-CM

## 2017-11-09 DIAGNOSIS — I10 ESSENTIAL HYPERTENSION WITH GOAL BLOOD PRESSURE LESS THAN 140/90: Primary | ICD-10-CM

## 2017-11-09 DIAGNOSIS — E78.5 HYPERLIPIDEMIA LDL GOAL <100: ICD-10-CM

## 2017-11-09 PROCEDURE — 99214 OFFICE O/P EST MOD 30 MIN: CPT | Mod: 25 | Performed by: INTERNAL MEDICINE

## 2017-11-09 PROCEDURE — 20610 DRAIN/INJ JOINT/BURSA W/O US: CPT | Performed by: INTERNAL MEDICINE

## 2017-11-09 NOTE — PROGRESS NOTES
"  SUBJECTIVE:   Patricia Aden is a 72 year old female who presents to clinic today for the following health issues:    Patient presents to the clinic to follow up on her right shoulder pain. Sje states that her right shoulder is extremely sore. If she sleeps on her right side it is painful. She also reports crepitation with flexion and extension. Her left shoulder is not as bad as the right. Her shoulder pain has been worsening over time. She is not taking anything to improve her symptoms. She complains of hands \"locking\" in certain positions. Locking does not resolve without manipulation.  She reports increasing difficulty with balance. She states she is tripping and falling a lot, she states that it is over her own feet/things on the floor. Patient has paraesthesias and tingling in her feet but it is improved from what it has been. Paraesthesias exacerbated when she goes to bed. Patient reports numbness around her incision site from her knee replacement surgery.      Problem list and histories reviewed & adjusted, as indicated.  Additional history: as documented    Current Outpatient Prescriptions   Medication Sig Dispense Refill     gabapentin (NEURONTIN) 300 MG capsule TAKE 2 CAPSULES(600 MG) BY MOUTH FOUR TIMES DAILY 240 capsule 0     insulin detemir (LEVEMIR) 100 UNIT/ML injection Inject 41 Units Subcutaneous At Bedtime 10.5 mL 3     BD NILDA U/F 32G X 4 MM insulin pen needle USE 1 TO 2 DAILY OR AS DIRECTED WITH LEVEMIR 100 each 0     simvastatin (ZOCOR) 80 MG tablet Take 0.5 tablets (40 mg) by mouth At Bedtime 45 tablet 3     metoprolol (LOPRESSOR) 100 MG tablet Take 1 tablet (100 mg) by mouth 2 times daily 180 tablet 3     metFORMIN (GLUCOPHAGE) 1000 MG tablet Take 1 tablet (1,000 mg) by mouth 2 times daily (with meals) 180 tablet 2     buPROPion (BUDEPRION SR) 100 MG 12 hr tablet Take 1 tablet (100 mg) by mouth 2 times daily 180 tablet 1     omeprazole (PRILOSEC) 20 MG CR capsule Take 1 capsule (20 mg) by " "mouth daily 90 capsule 1     venlafaxine (EFFEXOR-XR) 150 MG 24 hr capsule Take 1 capsule (150 mg) by mouth daily With breakfast 90 capsule 1     QUEtiapine (SEROQUEL) 50 MG tablet Take 1 tablet (50 mg) by mouth At Bedtime 90 tablet 1     glipiZIDE (GLIPIZIDE XL) 10 MG 24 hr tablet Take 1 tablet (10 mg) by mouth 2 times daily 180 tablet 1     ACETAMINOPHEN PO Take 500 mg by mouth       nystatin (MYCOSTATIN) cream Apply topically 3 times daily 60 g 0     blood glucose monitoring (ONE TOUCH DELICA) lancets Use to test blood sugars 3 times daily or as directed. 1 Box prn     blood glucose (ONE TOUCH ULTRA) test strip Use to test blood sugar 3 times daily or as directed. 100 strip 3     Omega-3 Fatty Acids (OMEGA-3 FISH OIL PO) Take 1,200 mg by mouth every evening        VITAMIN D, CHOLECALCIFEROL, PO Take 5,000 Units by mouth daily        Multiple Vitamins-Minerals (MULTIVITAMIN & MINERAL PO) Take 1 tablet by mouth every evening        Allergies   Allergen Reactions     Fish Allergy Anaphylaxis     [place] roughy-anaphylaxsis     Penicillins Unknown     Reviewed and updated as needed this visit by clinical staff  Tobacco  Allergies  Meds  Soc Hx      Reviewed and updated as needed this visit by Provider       ROS:  Constitutional, HEENT, cardiovascular, pulmonary, gi and gu systems are negative, except as otherwise noted.    This document serves as a record of the services and decisions personally performed and made by Iglesia Moreno MD. It was created on his/her behalf by Nick Mcclure, a trained medical scribe. The creation of this document is based the provider's statements to the medical scribe.  Marita Mcclure 4:14 PM, November 9, 2017    OBJECTIVE:   /71 (BP Location: Left arm, Cuff Size: Adult Regular)  Pulse 71  Temp 97.2  F (36.2  C) (Tympanic)  Ht 1.651 m (5' 5\")  Wt 77.6 kg (171 lb)  SpO2 97%  Breastfeeding? No  BMI 28.46 kg/m2  Body mass index is 28.46 kg/(m^2).    Neck was " supple without adenopathy or thyromegaly her carotids were normal without bruits  Chest clear to auscultation and percussion  Cardiovascular S1 and S2 are physiologic without murmurs or gallops  Abdomen bowel sounds were normal.  There is no palpable mass or organomegaly  Right shoulder she has normal flexion and extension, she has crepitation with abduction, and supraspinatus tendon was non tender  Extremities nontender without any edema, she has a left drop foot, she has hypersensitivity below the ankle of both feet  She was walking down the arauz and had instability with turning   Pulses pedal pulses are as described otherwise his pulses are bilaterally symmetrical throughout without bruits  Skin without significant abnormality, clean and dry    Procedure:  After sterilely preparing her skin, from a posterior approach 60 MG of medrol with lidocaine and marcaine was injected into her glenohumoral joint without difficulty    Diagnostic Test Results:  none     ASSESSMENT/PLAN:     1. Essential hypertension with goal blood pressure less than 140/90  -Normal at this visit.    2. Type 2 diabetes mellitus with diabetic neuropathy, with long-term current use of insulin (H)  -She is continuing to manage her diet, and she is in the process of weight loss.    3. Peroneal Palsy  -Most likely from her previous knee surgery    4.Osteoarthritis of right shoulder  -Cortisone shot given to patient.    -Recommended a maura for better support and to avoid falling.    Iglesia Moreno MD  [facility]    The information in this document, created by the medical scribe for me, accurately reflects the services I personally performed and the decisions made by me. I have reviewed and approved this document for accuracy prior to leaving the patient care area.  Iglesia Moreno MD  5:11 PM, 11/09/17

## 2017-11-09 NOTE — NURSING NOTE
"Chief Complaint   Patient presents with     Follow Up For     last office visit 10/26/17       Initial /71 (BP Location: Left arm, Cuff Size: Adult Regular)  Pulse 71  Temp 97.2  F (36.2  C) (Tympanic)  Ht 5' 5\" (1.651 m)  Wt 171 lb (77.6 kg)  SpO2 97%  Breastfeeding? No  BMI 28.46 kg/m2 Estimated body mass index is 28.46 kg/(m^2) as calculated from the following:    Height as of this encounter: 5' 5\" (1.651 m).    Weight as of this encounter: 171 lb (77.6 kg).  Medication Reconciliation: complete     KWASI Lizama        "

## 2017-11-09 NOTE — MR AVS SNAPSHOT
After Visit Summary   11/9/2017    Patricia Aden    MRN: [id number]           Patient Information     Date Of Birth          1945        Visit Information        Provider Department      11/9/2017 4:00 PM Iglesia Moreno MD [facility]        Today's Diagnoses     Essential hypertension with goal blood pressure less than 140/90    -  1    Major depressive disorder, recurrent episode, moderate (H)        Type 2 diabetes mellitus with diabetic neuropathy, with long-term current use of insulin (H)        Anxiety        Urgency incontinence        Age-related osteoporosis without current pathological fracture        Hyperlipidemia LDL goal <100        Primary osteoarthritis of right shoulder           Follow-ups after your visit        Your next 10 appointments already scheduled     Catalino 15, 2018  7:30 AM CST   Office Visit with Iglesia Moreno MD   [facility]    [address] [address] [phone]           Bring a current list of meds and any records pertaining to this visit. For Physicals, please bring immunization records and any forms needing to be filled out. Please arrive 10 minutes early to complete paperwork.              Who to contact     If you have questions or need follow up information about today's clinic visit or your schedule please contact [facility] directly at [phone].  Normal or non-critical lab and imaging results will be communicated to you by MyChart, letter or phone within 4 business days after the clinic has received the results. If you do not hear from us within 7 days, please contact the clinic through [employer] or phone. If you have a critical or abnormal lab result, we will notify you by phone as soon as possible.  Submit refill requests through [employer] or call your pharmacy and they will forward the refill request to us. Please allow 3 business days for your refill to be completed.     "      Additional Information About Your Visit        MyChart Information     [employer] gives you secure access to your electronic health record. If you see a primary care provider, you can also send messages to your care team and make appointments. If you have questions, please call your primary care clinic.  If you do not have a primary care provider, please call [phone] and they will assist you.        Care EveryWhere ID     This is your Care EveryWhere ID. This could be used by other organizations to access your [facility] medical records  [id number]        Your Vitals Were     Pulse Temperature Height Pulse Oximetry Breastfeeding? BMI (Body Mass Index)    71 97.2  F (36.2  C) (Tympanic) 5' 5\" (1.651 m) 97% No 28.46 kg/m2       Blood Pressure from Last 3 Encounters:   11/09/17 135/71   10/26/17 (!) 151/94   08/07/17 124/70    Weight from Last 3 Encounters:   11/09/17 171 lb (77.6 kg)   10/26/17 170 lb (77.1 kg)   08/07/17 173 lb (78.5 kg)              We Performed the Following     DRAIN/INJECT LARGE JOINT/BURSA     METHYLPREDNISOLONE 80 MG INJ        Primary Care Provider Office Phone # Fax #    Iglesia Moreno [phone] [address] [address]        Equal Access to Services     [facility]: Hadii aad ku hadasho Soomaali, waaxda luqadaha, qaybta kaalmada adeegyada, joey martinez . [facility] [phone].    ATENCIÓN: Si habla español, tiene a terrell disposición servicios gratuitos de asistencia lingüística. Cor[place] al [phone].    We comply with applicable federal civil rights laws and [place] laws. We do not discriminate on the basis of race, color, national origin, age, disability, sex, sexual orientation, or gender identity.            Thank you!     Thank you for choosing [facility]  for your care. Our goal is always to provide you with excellent care. Hearing back from our patients is one way we can continue to " improve our services. Please take a few minutes to complete the written survey that you may receive in the mail after your visit with us. Thank you!             Your Updated Medication List - Protect others around you: Learn how to safely use, store and throw away your medicines at www.disposemymeds.org.          This list is accurate as of: 11/9/17 11:59 PM.  Always use your most recent med list.                   Brand Name Dispense Instructions for use Diagnosis    ACETAMINOPHEN PO      Take 500 mg by mouth        BD NILDA U/F 32G X 4 MM   Generic drug:  insulin pen needle     100 each    USE 1 TO 2 DAILY OR AS DIRECTED WITH LEVEMIR    Type 2 diabetes mellitus with diabetic neuropathy (H)       blood glucose monitoring lancets     1 Box    Use to test blood sugars 3 times daily or as directed.    Type 2 diabetes, HbA1C goal < 8% (H)       blood glucose monitoring test strip    ONETOUCH ULTRA    100 strip    Use to test blood sugar 3 times daily or as directed.    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 (H)       glipiZIDE 10 MG 24 hr tablet    glipiZIDE XL    180 tablet    Take 1 tablet (10 mg) by mouth 2 times daily    Type 2 diabetes mellitus with diabetic nephropathy, with long-term current use of insulin (H)       insulin detemir 100 UNIT/ML injection    LEVEMIR    10.5 mL    Inject 41 Units Subcutaneous At Bedtime    Type 2 diabetes mellitus with diabetic nephropathy, with long-term current use of insulin (H)       metFORMIN 1000 MG tablet    GLUCOPHAGE    180 tablet    Take 1 tablet (1,000 mg) by mouth 2 times daily (with meals)     Type 2 diabetes mellitus with diabetic nephropathy, with long-term current use of insulin (H)       metoprolol 100 MG tablet    LOPRESSOR    180 tablet    Take 1 tablet (100 mg) by mouth 2 times daily    Essential hypertension       MULTIVITAMIN & MINERAL PO      Take 1 tablet by mouth every evening        nystatin cream    MYCOSTATIN    60 g    Apply topically 3 times daily    Candidiasis of skin       OMEGA-3 FISH OIL PO      Take 1,200 mg by mouth every evening        omeprazole 20 MG CR capsule    priLOSEC    90 capsule    Take 1 capsule (20 mg) by mouth daily    Gastroesophageal reflux disease with esophagitis       QUEtiapine 50 MG tablet    SEROQUEL    90 tablet    Take 1 tablet (50 mg) by mouth At Bedtime    Major depressive disorder, recurrent episode, moderate (H)       simvastatin 80 MG tablet    ZOCOR    45 tablet    Take 0.5 tablets (40 mg) by mouth At Bedtime    Hyperlipidemia LDL goal <100       venlafaxine 150 MG 24 hr capsule    EFFEXOR-XR    90 capsule    Take 1 capsule (150 mg) by mouth daily With breakfast    Major depressive disorder, recurrent episode, moderate (H)       VITAMIN D (CHOLECALCIFEROL) PO      Take 5,000 Units by mouth daily

## 2017-11-12 DIAGNOSIS — E11.40 TYPE 2 DIABETES MELLITUS WITH DIABETIC NEUROPATHY (H): ICD-10-CM

## 2017-11-14 RX ORDER — PEN NEEDLE, DIABETIC 32GX 5/32"
NEEDLE, DISPOSABLE MISCELLANEOUS
Qty: 100 EACH | Refills: 1 | Status: SHIPPED | OUTPATIENT
Start: 2017-11-14 | End: 2018-05-23

## 2017-11-14 NOTE — TELEPHONE ENCOUNTER
Prescription approved per [facility] Refill Protocol  Shelby Macario RN BS

## 2017-11-20 ENCOUNTER — TELEPHONE (OUTPATIENT)
Dept: FAMILY MEDICINE | Facility: CLINIC | Age: 72
End: 2017-11-20

## 2017-11-20 NOTE — TELEPHONE ENCOUNTER
Fax from [facility]/[employer], recommend ace/arb, routed to , see fax, also pcp changed above?    Lorenza Troncoso RN, BSN  Message handled by Nurse Triage.

## 2017-11-20 NOTE — TELEPHONE ENCOUNTER
Pt has new PCP per [facility], see above, no action needed  Lorenza Troncoso RN, BSN  Message handled by Nurse Triage.

## 2017-11-28 DIAGNOSIS — E13.49 OTHER DIABETIC NEUROLOGICAL COMPLICATION ASSOCIATED WITH OTHER SPECIFIED DIABETES MELLITUS (H): ICD-10-CM

## 2017-11-29 ENCOUNTER — RADIANT APPOINTMENT (OUTPATIENT)
Dept: GENERAL RADIOLOGY | Facility: CLINIC | Age: 72
End: 2017-11-29
Attending: NURSE PRACTITIONER
Payer: COMMERCIAL

## 2017-11-29 ENCOUNTER — OFFICE VISIT (OUTPATIENT)
Dept: URGENT CARE | Facility: URGENT CARE | Age: 72
End: 2017-11-29
Payer: COMMERCIAL

## 2017-11-29 VITALS
HEART RATE: 82 BPM | WEIGHT: 176 LBS | BODY MASS INDEX: 29.29 KG/M2 | DIASTOLIC BLOOD PRESSURE: 88 MMHG | SYSTOLIC BLOOD PRESSURE: 142 MMHG | RESPIRATION RATE: 18 BRPM | OXYGEN SATURATION: 96 %

## 2017-11-29 DIAGNOSIS — R07.81 RIB PAIN ON LEFT SIDE: ICD-10-CM

## 2017-11-29 DIAGNOSIS — R07.81 RIB PAIN ON LEFT SIDE: Primary | ICD-10-CM

## 2017-11-29 PROCEDURE — 71101 X-RAY EXAM UNILAT RIBS/CHEST: CPT | Mod: LT

## 2017-11-29 PROCEDURE — 99213 OFFICE O/P EST LOW 20 MIN: CPT

## 2017-11-29 NOTE — NURSING NOTE
"Chief Complaint   Patient presents with     Chest Pain     Left chest/rib pain. Recent surgery and recent fall        Initial BP (!) 172/96  Pulse 82  Resp 18  Wt 176 lb (79.8 kg)  SpO2 92%  BMI 29.29 kg/m2 Estimated body mass index is 29.29 kg/(m^2) as calculated from the following:    Height as of 11/9/17: 5' 5\" (1.651 m).    Weight as of this encounter: 176 lb (79.8 kg).  Medication Reconciliation: complete     Pollo Plasencia, GUILLERMO    "

## 2017-11-29 NOTE — PROGRESS NOTES
SUBJECTIVE:  Chief Complaint   Patient presents with     Chest Pain     Left chest/rib pain. Recent surgery and recent fall      Patricia Aden is a 72 year old female who presents with a chief complaint of left rib pain.  Symptoms began 4 day(s) ago, are moderate and sudden onset  Context:  Injury:Yes: fell at home 4-5 days ago onto left chest.  Did not brace fall with her arm.   Injury happened while at home. How: fall  immediate pain, was able to bear weight directly after injury, no deformity was noted by the patient.   Reports she often falls since having a knee replacement last year.  Always falls to the left side.  Rating pain +6/10.    Pain exacerbated by walking, repetitive motion and twisting Relieved by rest and ice.  She treated it initially with ice. This is not the first time this type of injury has occurred to this patient.     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     Depressive disorder      Diabetes mellitus (H)      Gastro-oesophageal reflux disease      High cholesterol      History of blood transfusion     many years ago     Hypertension     No cardiology DR     Pain in right hip      Current Outpatient Prescriptions   Medication Sig Dispense Refill     BD NILDA U/F 32G X 4 MM insulin pen needle USE 1 TO 2 DAILY OR AS DIRECTED WITH LEVEMIR 100 each 1     gabapentin (NEURONTIN) 300 MG capsule TAKE 2 CAPSULES(600 MG) BY MOUTH FOUR TIMES DAILY 240 capsule 0     insulin detemir (LEVEMIR) 100 UNIT/ML injection Inject 41 Units Subcutaneous At Bedtime 10.5 mL 3     simvastatin (ZOCOR) 80 MG tablet Take 0.5 tablets (40 mg) by mouth At Bedtime 45 tablet 3     metoprolol (LOPRESSOR) 100 MG tablet Take 1 tablet (100 mg) by mouth 2 times daily 180 tablet 3     metFORMIN (GLUCOPHAGE) 1000 MG tablet Take 1 tablet (1,000 mg) by mouth 2 times daily (with meals) 180 tablet 2     buPROPion  (BUDEPRION SR) 100 MG 12 hr tablet Take 1 tablet (100 mg) by mouth 2 times daily 180 tablet 1     omeprazole (PRILOSEC) 20 MG CR capsule Take 1 capsule (20 mg) by mouth daily 90 capsule 1     venlafaxine (EFFEXOR-XR) 150 MG 24 hr capsule Take 1 capsule (150 mg) by mouth daily With breakfast 90 capsule 1     QUEtiapine (SEROQUEL) 50 MG tablet Take 1 tablet (50 mg) by mouth At Bedtime 90 tablet 1     glipiZIDE (GLIPIZIDE XL) 10 MG 24 hr tablet Take 1 tablet (10 mg) by mouth 2 times daily 180 tablet 1     ACETAMINOPHEN PO Take 500 mg by mouth       blood glucose monitoring (ONE TOUCH DELICA) lancets Use to test blood sugars 3 times daily or as directed. 1 Box prn     blood glucose (ONE TOUCH ULTRA) test strip Use to test blood sugar 3 times daily or as directed. 100 strip 3     nystatin (MYCOSTATIN) cream Apply topically 3 times daily (Patient not taking: Reported on 11/29/2017) 60 g 0     Omega-3 Fatty Acids (OMEGA-3 FISH OIL PO) Take 1,200 mg by mouth every evening        VITAMIN D, CHOLECALCIFEROL, PO Take 5,000 Units by mouth daily        Multiple Vitamins-Minerals (MULTIVITAMIN & MINERAL PO) Take 1 tablet by mouth every evening        Social History   Substance Use Topics     Smoking status: Former Smoker     Years: 4.00     Types: Cigarettes     Smokeless tobacco: Never Used      Comment: quit 1977     Alcohol use No       ROS:  Review of systems negative except as stated above.    EXAM:   /88  Pulse 82  Resp 18  Wt 176 lb (79.8 kg)  SpO2 96%  BMI 29.29 kg/m2  M/S Exam: chest wall tenderness to palpation, decreased ROM with flexion and rotation at the waste and pain with movement.   GENERAL APPEARANCE: healthy, alert and no distress  EXTREMITIES: peripheral pulses normal  SKIN: no suspicious lesions or rashes  NEURO: Normal strength and tone, sensory exam grossly normal, mentation intact and speech normal    X-RAY was done.  Xray interpreted as negative in the clinic.  Pending radiologist review.       ASSESSMENT:     Encounter Diagnosis   Name Primary?     Rib pain on left side Yes       PLAN:  Recommend ice and rest.  Aleve or ibuprofen daily for up to 1 week.    F/u with PCP if persists or worsens.    See orders in epic

## 2017-11-29 NOTE — MR AVS SNAPSHOT
After Visit Summary   11/29/2017    Patricia Aden    MRN: [id number]           Patient Information     Date Of Birth          1945        Visit Information        Provider Department      11/29/2017 5:05 PM CS URGENT CARE [facility]        Today's Diagnoses     Rib pain on left side    -  1       Follow-ups after your visit        Your next 10 appointments already scheduled     Catalino 15, 2018  7:30 AM CST   Office Visit with Iglesia Moreno MD   [facility] ([facility])    [address] [facility] [address] [phone]           Bring a current list of meds and any records pertaining to this visit. For Physicals, please bring immunization records and any forms needing to be filled out. Please arrive 10 minutes early to complete paperwork.              Who to contact     If you have questions or need follow up information about today's clinic visit or your schedule please contact [facility] directly at [phone].  Normal or non-critical lab and imaging results will be communicated to you by Scopelechart, letter or phone within 4 business days after the clinic has received the results. If you do not hear from us within 7 days, please contact the clinic through [employer] or phone. If you have a critical or abnormal lab result, we will notify you by phone as soon as possible.  Submit refill requests through [employer] or call your pharmacy and they will forward the refill request to us. Please allow 3 business days for your refill to be completed.          Additional Information About Your Visit        [facility] Information     [employer] gives you secure access to your electronic health record. If you see a primary care provider, you can also send messages to your care team and make appointments. If you have questions, please call your primary care clinic.  If you do not have a primary care provider, please call [phone] and they  will assist you.        Care EveryWhere ID     This is your Care EveryWhere ID. This could be used by other organizations to access your [facility] medical records  [id number]        Your Vitals Were     Pulse Respirations Pulse Oximetry BMI (Body Mass Index)          82 18 96% 29.29 kg/m2         Blood Pressure from Last 3 Encounters:   11/29/17 142/88   11/09/17 135/71   10/26/17 (!) 151/94    Weight from Last 3 Encounters:   11/29/17 176 lb (79.8 kg)   11/09/17 171 lb (77.6 kg)   10/26/17 170 lb (77.1 kg)               Primary Care Provider Office Phone # Fax #    Iglesia Moreno [phone] [address] [address]        Equal Access to Services     GUILLERMO DAWKINS : Hadii aad ku hadasho Soomaali, waaxda luqadaha, qaybta kaalmada adeegyada, joey sal haygaurav martinez . So [facility] [phone].    ATENCIÓN: Si habla español, tiene a terrell disposición servicios gratuitos de asistencia lingüística. Corbin al [phone].    We comply with applicable federal civil rights laws and [place] laws. We do not discriminate on the basis of race, color, national origin, age, disability, sex, sexual orientation, or gender identity.            Thank you!     Thank you for choosing [facility]  for your care. Our goal is always to provide you with excellent care. Hearing back from our patients is one way we can continue to improve our services. Please take a few minutes to complete the written survey that you may receive in the mail after your visit with us. Thank you!             Your Updated Medication List - Protect others around you: Learn how to safely use, store and throw away your medicines at www.disposemymeds.org.          This list is accurate as of: 11/29/17  6:00 PM.  Always use your most recent med list.                   Brand Name Dispense Instructions for use Diagnosis    ACETAMINOPHEN PO      Take 500 mg by mouth        BD NILDA U/F 32G  X 4 MM   Generic drug:  insulin pen needle     100 each    USE 1 TO 2 DAILY OR AS DIRECTED WITH LEVEMIR    Type 2 diabetes mellitus with diabetic neuropathy (H)       blood glucose monitoring lancets     1 Box    Use to test blood sugars 3 times daily or as directed.    Type 2 diabetes, HbA1C goal < 8% (H)       blood glucose monitoring test strip    ONETOUCH ULTRA    100 strip    Use to test blood sugar 3 times daily or as directed.    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 (H)       glipiZIDE 10 MG 24 hr tablet    glipiZIDE XL    180 tablet    Take 1 tablet (10 mg) by mouth 2 times daily    Type 2 diabetes mellitus with diabetic nephropathy, with long-term current use of insulin (H)       insulin detemir 100 UNIT/ML injection    LEVEMIR    10.5 mL    Inject 41 Units Subcutaneous At Bedtime    Type 2 diabetes mellitus with diabetic nephropathy, with long-term current use of insulin (H)       metFORMIN 1000 MG tablet    GLUCOPHAGE    180 tablet    Take 1 tablet (1,000 mg) by mouth 2 times daily (with meals)    Type 2 diabetes mellitus with diabetic nephropathy, with long-term current use of insulin (H)       metoprolol 100 MG tablet    LOPRESSOR    180 tablet    Take 1 tablet (100 mg) by mouth 2 times daily    Essential hypertension       MULTIVITAMIN & MINERAL PO      Take 1 tablet by mouth every evening        nystatin cream    MYCOSTATIN    60 g    Apply topically 3 times daily    Candidiasis of skin       OMEGA-3 FISH OIL PO      Take 1,200 mg by mouth every evening        omeprazole 20 MG CR capsule    priLOSEC    90 capsule    Take 1  capsule (20 mg) by mouth daily    Gastroesophageal reflux disease with esophagitis       QUEtiapine 50 MG tablet    SEROQUEL    90 tablet    Take 1 tablet (50 mg) by mouth At Bedtime    Major depressive disorder, recurrent episode, moderate (H)       simvastatin 80 MG tablet    ZOCOR    45 tablet    Take 0.5 tablets (40 mg) by mouth At Bedtime    Hyperlipidemia LDL goal <100       venlafaxine 150 MG 24 hr capsule    EFFEXOR-XR    90 capsule    Take 1 capsule (150 mg) by mouth daily With breakfast    Major depressive disorder, recurrent episode, moderate (H)       VITAMIN D (CHOLECALCIFEROL) PO      Take 5,000 Units by mouth daily

## 2017-11-30 RX ORDER — GABAPENTIN 300 MG/1
CAPSULE ORAL
Qty: 240 CAPSULE | Refills: 0 | COMMUNITY
Start: 2017-11-30 | End: 2018-01-24

## 2017-11-30 NOTE — TELEPHONE ENCOUNTER
Pt has new pcp, see above, called [facility], informed to send gabapentin refill to pcp listed  Lorenza Troncoso RN, BSN  Message handled by Nurse Triage.

## 2017-11-30 NOTE — TELEPHONE ENCOUNTER
gabapentin (NEURONTIN) 300 MG capsule     Sig: TAKE 2 CAPSULES(600 MG) BY MOUTH FOUR TIMES DAILY     Last Written Prescription Date: 10/19/17  Last Quantity: 240, # refills: 0  Last Office Visit with [facility], [facility] or  [facility] prescribing provider: 11/9/2017  Next 5 appointments (look out 90 days)     Catalino 15, 2018  7:30 AM CST   [facility]    [address] [facility] [id number] [phone]                   Creatinine   Date Value Ref Range Status   08/07/2017 0.75 0.52 - 1.04 mg/dL Final     Lab Results   Component Value Date    AST 64 01/30/2017     Lab Results   Component Value Date    ALT 89 01/30/2017     BP Readings from Last 3 Encounters:   11/29/17 142/88   11/09/17 135/71   10/26/17 (!) 151/94       Associated Diagnoses   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

## 2017-12-12 DIAGNOSIS — K21.00 GASTROESOPHAGEAL REFLUX DISEASE WITH ESOPHAGITIS: ICD-10-CM

## 2017-12-12 NOTE — TELEPHONE ENCOUNTER
Pending Prescriptions:                       Disp   Refills    omeprazole (PRILOSEC) 20 MG CR capsule    90 cap*1            Sig: Take 1 capsule (20 mg) by mouth daily        LOV 11/09/2017

## 2018-01-18 ENCOUNTER — TELEPHONE (OUTPATIENT)
Dept: ORTHOPEDICS | Facility: CLINIC | Age: 73
End: 2018-01-18

## 2018-01-18 NOTE — TELEPHONE ENCOUNTER
[facility] returned call. Patient is new to their office. Last week she was seen just for an exam and nothing invasive was done, no cleaning. Patient is scheduled for a cleaning on 1/23/18.   Informed will need to speak with patient and get back with her. Will also need to check with our provider regarding if prophylactic antibiotic will need to be prescribed. She states their office is only open until noon on Fridays, otherwise 8-5pm Monday through Thursday.     Patient had previous L TKA on 12/8/15 by Dr. Delgado and has had bilateral LORNA as well.     Left message for patient to return call. Need to get more information of when patient states Dr. Delgado prescribed antibiotic and what pharmacy.     PHILLIP Stratton RN

## 2018-01-18 NOTE — TELEPHONE ENCOUNTER
Received voicemail from Dr. Jim Velasquez's office, [facility].   They are getting conflicting information from patient.   Patient states she has a Penicillin allergy but has received Amoxicillin from  before.   Please call.     Left message with Sh[facility]na to have Eva return my call. She will be back in the office this afternoon.     PHILLIP Stratton RN

## 2018-01-22 NOTE — TELEPHONE ENCOUNTER
Patient returns call. Informed that dentist office had contacted us and we need permission to speak with them. She states we have her permission.   Informed we have not prescribed an antibiotic for her for dental cleanings before. She thinks she has not had her teeth cleaned since before surgery in 2015.   Informed will need to check with Dr. Delgado and get back with her tomorrow.   She states her appointment is at 1:30 pm.

## 2018-01-22 NOTE — TELEPHONE ENCOUNTER
Patient left voicemail on 1/19/18 returning our call. She would like a return call to [phone].     Left message for patient to return call.     PHILLIP Stratton RN

## 2018-01-22 NOTE — TELEPHONE ENCOUNTER
Patient returned call. Informed that we have not prescribed antibiotics for her prophylactically before. She does not think she is having a cleaning tomorrow. She is have a partial fitting.   Informed will check with Dr. Delgado and get back with her tomorrow when he returns to the office.     Phone call to [facility]. Patient is having a partial fitted tomorrow and does not need antibiotics. Informed will check with Dr. Delgado for future cleanings or repairs and get back with them.   They want to be informed for future care.     Left message for patient that antibiotics are not needed for her appointment tomorrow. Will get back with her regarding future appointments.     Please advise if patient needs antibiotic prior to dental cleanings/invasive dental treatment. She is type 2 diabetic. Is also allergic to penicillin.     Routing to Dr. Delgado.     PHILLIP Stratton RN

## 2018-01-23 NOTE — TELEPHONE ENCOUNTER
Left messages with the patient and dental office regarding abx for future dental work.  As pt has penicillin allergy - abx is clindamycin - 600mg 1 hr prior to procedure.

## 2018-01-23 NOTE — TELEPHONE ENCOUNTER
OK to go without Abx for partial fitting. For future cleaning, because of her DM, I would have her stay on prophylactic Abx

## 2018-03-10 DIAGNOSIS — F33.1 MAJOR DEPRESSIVE DISORDER, RECURRENT EPISODE, MODERATE (H): ICD-10-CM

## 2018-03-10 DIAGNOSIS — E11.21 TYPE 2 DIABETES MELLITUS WITH DIABETIC NEPHROPATHY, WITH LONG-TERM CURRENT USE OF INSULIN (H): ICD-10-CM

## 2018-03-10 DIAGNOSIS — Z79.4 TYPE 2 DIABETES MELLITUS WITH DIABETIC NEPHROPATHY, WITH LONG-TERM CURRENT USE OF INSULIN (H): ICD-10-CM

## 2018-03-10 RX ORDER — INSULIN DETEMIR 100 [IU]/ML
INJECTION, SOLUTION SUBCUTANEOUS
Qty: 15 ML | Refills: 0 | Status: SHIPPED | OUTPATIENT
Start: 2018-03-10 | End: 2018-03-13

## 2018-03-10 NOTE — TELEPHONE ENCOUNTER
Clinic Action Needed:No/FYI only/follow up as needed  Reason for Call: Patricia called to check on status of refill status of her levemir flex pen that was sent in from pharmacy.  She failed refill protocol due to A1C and blood pressure readings.  Paged on call provider for [facility] to speak to me at [facility].  Dr. Preston is on call and returned page.  He gave verbal for a one time refill (Levemir pens are ordered per box 5 pens in each box and pharmacy won't split up box).  Patricia was notified that she is due for an office visit for further refills, she states she has an appointment March 13, 2018 at 8:30 am.  A refill request for Effexor also came in from pharmacy and was not addressed over the weekend with on call provider, that has been pended.  Thank you.   Routed to:  Kristopher Ruiz, RN  [facility]

## 2018-03-13 ENCOUNTER — OFFICE VISIT (OUTPATIENT)
Dept: FAMILY MEDICINE | Facility: CLINIC | Age: 73
End: 2018-03-13
Payer: COMMERCIAL

## 2018-03-13 VITALS
TEMPERATURE: 97.8 F | BODY MASS INDEX: 28.49 KG/M2 | SYSTOLIC BLOOD PRESSURE: 136 MMHG | HEART RATE: 77 BPM | DIASTOLIC BLOOD PRESSURE: 73 MMHG | HEIGHT: 65 IN | WEIGHT: 171 LBS | OXYGEN SATURATION: 97 %

## 2018-03-13 DIAGNOSIS — E11.40 TYPE 2 DIABETES MELLITUS WITH DIABETIC NEUROPATHY, WITH LONG-TERM CURRENT USE OF INSULIN (H): ICD-10-CM

## 2018-03-13 DIAGNOSIS — M81.0 AGE-RELATED OSTEOPOROSIS WITHOUT CURRENT PATHOLOGICAL FRACTURE: ICD-10-CM

## 2018-03-13 DIAGNOSIS — E03.8 OTHER SPECIFIED HYPOTHYROIDISM: ICD-10-CM

## 2018-03-13 DIAGNOSIS — F41.9 ANXIETY: ICD-10-CM

## 2018-03-13 DIAGNOSIS — Z96.652 STATUS POST TOTAL LEFT KNEE REPLACEMENT: ICD-10-CM

## 2018-03-13 DIAGNOSIS — I10 ESSENTIAL HYPERTENSION WITH GOAL BLOOD PRESSURE LESS THAN 140/90: ICD-10-CM

## 2018-03-13 DIAGNOSIS — E11.21 TYPE 2 DIABETES MELLITUS WITH DIABETIC NEPHROPATHY, WITH LONG-TERM CURRENT USE OF INSULIN (H): Primary | ICD-10-CM

## 2018-03-13 DIAGNOSIS — Z79.4 TYPE 2 DIABETES MELLITUS WITH DIABETIC NEUROPATHY, WITH LONG-TERM CURRENT USE OF INSULIN (H): ICD-10-CM

## 2018-03-13 DIAGNOSIS — Z79.4 TYPE 2 DIABETES MELLITUS WITH DIABETIC NEPHROPATHY, WITH LONG-TERM CURRENT USE OF INSULIN (H): Primary | ICD-10-CM

## 2018-03-13 DIAGNOSIS — E04.1 THYROID NODULE: ICD-10-CM

## 2018-03-13 DIAGNOSIS — N39.41 URGENCY INCONTINENCE: ICD-10-CM

## 2018-03-13 DIAGNOSIS — F33.1 MAJOR DEPRESSIVE DISORDER, RECURRENT EPISODE, MODERATE (H): ICD-10-CM

## 2018-03-13 DIAGNOSIS — E78.5 HYPERLIPIDEMIA LDL GOAL <100: ICD-10-CM

## 2018-03-13 LAB
ALBUMIN SERPL-MCNC: 4 G/DL (ref 3.4–5)
ALP SERPL-CCNC: 101 U/L (ref 40–150)
ALT SERPL W P-5'-P-CCNC: 52 U/L (ref 0–50)
ANION GAP SERPL CALCULATED.3IONS-SCNC: 6 MMOL/L (ref 3–14)
AST SERPL W P-5'-P-CCNC: 52 U/L (ref 0–45)
BILIRUB SERPL-MCNC: 0.6 MG/DL (ref 0.2–1.3)
BUN SERPL-MCNC: 12 MG/DL (ref 7–30)
CALCIUM SERPL-MCNC: 10.3 MG/DL (ref 8.5–10.1)
CHLORIDE SERPL-SCNC: 108 MMOL/L (ref 94–109)
CHOLEST SERPL-MCNC: 135 MG/DL
CO2 SERPL-SCNC: 29 MMOL/L (ref 20–32)
CREAT SERPL-MCNC: 0.68 MG/DL (ref 0.52–1.04)
CREAT UR-MCNC: 393 MG/DL
ERYTHROCYTE [DISTWIDTH] IN BLOOD BY AUTOMATED COUNT: 14.3 % (ref 10–15)
GFR SERPL CREATININE-BSD FRML MDRD: 85 ML/MIN/1.7M2
GLUCOSE SERPL-MCNC: 105 MG/DL (ref 70–99)
HBA1C MFR BLD: 7.9 % (ref 4.3–6)
HCT VFR BLD AUTO: 41.3 % (ref 35–47)
HDLC SERPL-MCNC: 38 MG/DL
HGB BLD-MCNC: 13.2 G/DL (ref 11.7–15.7)
LDLC SERPL CALC-MCNC: 62 MG/DL
MCH RBC QN AUTO: 29.6 PG (ref 26.5–33)
MCHC RBC AUTO-ENTMCNC: 32 G/DL (ref 31.5–36.5)
MCV RBC AUTO: 93 FL (ref 78–100)
MICROALBUMIN UR-MCNC: 194 MG/L
MICROALBUMIN/CREAT UR: 49.36 MG/G CR (ref 0–25)
NONHDLC SERPL-MCNC: 97 MG/DL
PLATELET # BLD AUTO: 249 10E9/L (ref 150–450)
POTASSIUM SERPL-SCNC: 4.4 MMOL/L (ref 3.4–5.3)
PROT SERPL-MCNC: 7.3 G/DL (ref 6.8–8.8)
RBC # BLD AUTO: 4.46 10E12/L (ref 3.8–5.2)
SODIUM SERPL-SCNC: 143 MMOL/L (ref 133–144)
T4 FREE SERPL-MCNC: 0.91 NG/DL (ref 0.76–1.46)
TRIGL SERPL-MCNC: 177 MG/DL
TSH SERPL DL<=0.005 MIU/L-ACNC: 9.73 MU/L (ref 0.4–4)
WBC # BLD AUTO: 7.7 10E9/L (ref 4–11)

## 2018-03-13 PROCEDURE — 99214 OFFICE O/P EST MOD 30 MIN: CPT | Mod: 25 | Performed by: INTERNAL MEDICINE

## 2018-03-13 PROCEDURE — 80053 COMPREHEN METABOLIC PANEL: CPT | Performed by: INTERNAL MEDICINE

## 2018-03-13 PROCEDURE — 90715 TDAP VACCINE 7 YRS/> IM: CPT | Performed by: INTERNAL MEDICINE

## 2018-03-13 PROCEDURE — 83036 HEMOGLOBIN GLYCOSYLATED A1C: CPT | Performed by: INTERNAL MEDICINE

## 2018-03-13 PROCEDURE — 90471 IMMUNIZATION ADMIN: CPT | Performed by: INTERNAL MEDICINE

## 2018-03-13 PROCEDURE — 99207 C FOOT EXAM  NO CHARGE: CPT | Performed by: INTERNAL MEDICINE

## 2018-03-13 PROCEDURE — 80061 LIPID PANEL: CPT | Performed by: INTERNAL MEDICINE

## 2018-03-13 PROCEDURE — 82043 UR ALBUMIN QUANTITATIVE: CPT | Performed by: INTERNAL MEDICINE

## 2018-03-13 PROCEDURE — 85027 COMPLETE CBC AUTOMATED: CPT | Performed by: INTERNAL MEDICINE

## 2018-03-13 PROCEDURE — 36415 COLL VENOUS BLD VENIPUNCTURE: CPT | Performed by: INTERNAL MEDICINE

## 2018-03-13 PROCEDURE — 84443 ASSAY THYROID STIM HORMONE: CPT | Performed by: INTERNAL MEDICINE

## 2018-03-13 PROCEDURE — 84439 ASSAY OF FREE THYROXINE: CPT | Performed by: INTERNAL MEDICINE

## 2018-03-13 NOTE — NURSING NOTE
Screening Questionnaire for Adult Immunization    Are you sick today?   No   Do you have allergies to medications, food, a vaccine component or latex?   No   Have you ever had a serious reaction after receiving a vaccination?   No   Do you have a long-term health problem with heart disease, lung disease, asthma, kidney disease, metabolic disease (e.g. diabetes), anemia, or other blood disorder?   No   Do you have cancer, leukemia, HIV/AIDS, or any other immune system problem?   No   In the past 3 months, have you taken medications that affect  your immune system, such as prednisone, other steroids, or anticancer drugs; drugs for the treatment of rheumatoid arthritis, Crohn s disease, or psoriasis; or have you had radiation treatments?   No   Have you had a seizure, or a brain or other nervous system problem?   No   During the past year, have you received a transfusion of blood or blood     products, or been given immune (gamma) globulin or antiviral drug?   No   For women: Are you pregnant or is there a chance you could become        pregnant during the next month?   No   Have you received any vaccinations in the past 4 weeks?   No     Immunization questionnaire answers were all negative.        Per orders of Dr. Moreno, injection of Tdap given by Eva Sharma. Patient instructed to remain in clinic for 15 minutes afterwards, and to report any adverse reaction to me immediately.       Screening performed by Eva Sharma on 3/13/2018 at 9:22 AM.

## 2018-03-13 NOTE — NURSING NOTE
"Chief Complaint   Patient presents with     RECHECK     Diabetes       Initial /73 (BP Location: Left arm, Cuff Size: Adult Regular)  Pulse 77  Temp 97.8  F (36.6  C) (Oral)  Ht 5' 5\" (1.651 m)  Wt 171 lb (77.6 kg)  SpO2 97%  BMI 28.46 kg/m2 Estimated body mass index is 28.46 kg/(m^2) as calculated from the following:    Height as of this encounter: 5' 5\" (1.651 m).    Weight as of this encounter: 171 lb (77.6 kg).  Medication Reconciliation: complete     Eva Bearden MA    "

## 2018-03-13 NOTE — PROGRESS NOTES
SUBJECTIVE:   Patricia Aden is a 72 year old female who presents to clinic today for the following health issues:    Diabetes Follow-up      Patient is checking blood sugars: not at all    Diabetic concerns: blood sugar frequently over 200     Symptoms of hypoglycemia (low blood sugar): none     Paresthesias (numbness or burning in feet) or sores: No     Date of last diabetic eye exam: due    BP Readings from Last 2 Encounters:   11/29/17 142/88   11/09/17 135/71     Hemoglobin A1C (%)   Date Value   10/26/2017 9.3 (H)   08/07/2017 9.7 (H)     LDL Cholesterol Calculated (mg/dL)   Date Value   01/30/2017 84   04/22/2016 71       Amount of exercise or physical activity: walking    Problems taking medications regularly: No    Medication side effects: none    Diet: regular (no restrictions)    Patient presents to the clinic to follow up on her ongoing issues. She reports that she is sore all the time with achy bones. She reports that both shoulders are sore with limited ROM, her left knee and femur are also sore. She reports that she uses an aspirin occasionally to manage her symptoms. She also had an epidural in her lower back a year ago that resolved her back pain. She reports that her back pain is returning. She does not exercise on a regular basis.  Patient has a history of diabetes. She monitors her blood sugars at home and she usually sees numbers that are around 200. She has only had low blood sugar once. She affirms to diabetic neuropathy that radiates into her mid calf or mid thigh.  She reports that every time she eats a large meal she has to have a bowel movement that is usually diarrhea. She also reports that she can also have episodes of vomiting at the same time. She denies any hematochezia. She reports her weight is staying the same however. She denies any urinary difficulties and swelling in her joints.      Problem list and histories reviewed & adjusted, as indicated.  Additional history: as  documented    Current Outpatient Prescriptions   Medication Sig Dispense Refill     LEVEMIR FLEXTOUCH 100 UNIT/ML injection INJECT 41 UNITS UNDER THE SKIN EVERY NIGHT AT BEDTIME. 15 mL 0     omeprazole (PRILOSEC) 20 MG CR capsule Take 1 capsule (20 mg) by mouth daily 90 capsule 1     gabapentin (NEURONTIN) 300 MG capsule TAKE 2 CAPSULES(600 MG) BY MOUTH FOUR TIMES DAILY 240 capsule 3     BD NILDA U/F 32G X 4 MM insulin pen needle USE 1 TO 2 DAILY OR AS DIRECTED WITH LEVEMIR 100 each 1     simvastatin (ZOCOR) 80 MG tablet Take 0.5 tablets (40 mg) by mouth At Bedtime 45 tablet 3     metoprolol (LOPRESSOR) 100 MG tablet Take 1 tablet (100 mg) by mouth 2 times daily 180 tablet 3     metFORMIN (GLUCOPHAGE) 1000 MG tablet Take 1 tablet (1,000 mg) by mouth 2 times daily (with meals) 180 tablet 2     buPROPion (BUDEPRION SR) 100 MG 12 hr tablet Take 1 tablet (100 mg) by mouth 2 times daily 180 tablet 1     venlafaxine (EFFEXOR-XR) 150 MG 24 hr capsule Take 1 capsule (150 mg) by mouth daily With breakfast 90 capsule 1     QUEtiapine (SEROQUEL) 50 MG tablet Take 1 tablet (50 mg) by mouth At Bedtime 90 tablet 1     glipiZIDE (GLIPIZIDE XL) 10 MG 24 hr tablet Take 1 tablet (10 mg) by mouth 2 times daily 180 tablet 1     ACETAMINOPHEN PO Take 500 mg by mouth       nystatin (MYCOSTATIN) cream Apply topically 3 times daily 60 g 0     blood glucose monitoring (ONE TOUCH DELICA) lancets Use to test blood sugars 3 times daily or as directed. 1 Box prn     blood glucose (ONE TOUCH ULTRA) test strip Use to test blood sugar 3 times daily or as directed. 100 strip 3     Omega-3 Fatty Acids (OMEGA-3 FISH OIL PO) Take 1,200 mg by mouth every evening        VITAMIN D, CHOLECALCIFEROL, PO Take 5,000 Units by mouth daily        Multiple Vitamins-Minerals (MULTIVITAMIN & MINERAL PO) Take 1 tablet by mouth every evening        Allergies   Allergen Reactions     Fish Allergy Anaphylaxis     [place] roughy-anaphylaxsis     Penicillins Unknown  "    Reviewed and updated as needed this visit by clinical staff  Tobacco  Allergies  Meds  Problems  Med Hx  Surg Hx  Fam Hx  Soc Hx        Reviewed and updated as needed this visit by Provider       ROS:  Constitutional, HEENT, cardiovascular, pulmonary, gi and gu systems are negative, except as otherwise noted.    This document serves as a record of the services and decisions personally performed and made by Iglesia Moreno MD. It was created on his/her behalf by Nick Mcclure, a trained medical scribe. The creation of this document is based the provider's statements to the medical scribe.  Scribe Nick Mcclure 8:33 AM, March 13, 2018    OBJECTIVE:     /73 (BP Location: Left arm, Cuff Size: Adult Regular)  Pulse 77  Temp 97.8  F (36.6  C) (Oral)  Ht 1.651 m (5' 5\")  Wt 77.6 kg (171 lb)  SpO2 97%  BMI 28.46 kg/m2  Body mass index is 28.46 kg/(m^2).     GENERAL: healthy, alert and no distress  EYES: Eyes grossly normal to inspection, PERRL and conjunctivae and sclerae normal  HENT: ear canals and TM's normal, nose and mouth without ulcers or lesions, poor dentition  NECK: no adenopathy, no asymmetry, masses, or scars and thyroid is asymmetric right greater than left without palpable nodules or tenderness   RESP: lungs clear to auscultation - no rales, rhonchi or wheezes  CV: regular rate and rhythm, normal S1 S2, no S3 or S4, no murmur, click or rub, no peripheral edema and peripheral pulses strong  ABDOMEN: soft, nontender, no hepatosplenomegaly, no masses and bowel sounds normal  MS: no gross musculoskeletal defects noted, no edema  Shoulders: show minor loss of internal rotation bilaterally, impingement was negative, and external rotation was essentially complete  KNEES: left knee replacement was non tender without any effusions  FEET: dry and scaly, she does has a laceration on the middle and second toe which is clean and dry which she attributes to dropping something on her foot, she " has paraesthesias to the mid calf  SKIN: no suspicious lesions or rashes  NEURO: Normal strength and tone, mentation intact and speech normal  PSYCH: mentation appears normal, affect normal/bright    Diagnostic Test Results:  No results found for this or any previous visit (from the past 24 hour(s)).    ASSESSMENT/PLAN:     1. Type 2 diabetes mellitus with diabetic nephropathy, with long-term current use of insulin (H)  -We requested that she reduce her Metformin to half tablet twice daily and Glucophage 20 MG twice daily and regularly checking her blood sugars twice a day  - TDAP VACCINE (ADACEL)  - Hemoglobin A1c  - Albumin Random Urine Quantitative with Creat Ratio  - C FOOT EXAM  NO CHARGE  - insulin detemir (LEVEMIR FLEXTOUCH) 100 UNIT/ML injection; INJECT 41 UNITS UNDER THE SKIN EVERY NIGHT AT BEDTIME.  Dispense: 15 mL; Refill: 0    2. Type 2 diabetes mellitus with diabetic neuropathy, with long-term current use of insulin (H)    3. Thyroid nodule  -We are rechecking her thyroid function and recommending a follow up ultrasound and possible re biopsy right inferior nodule  - TSH with free T4 reflex    4. Major depressive disorder, recurrent episode, moderate (H)    5. Hyperlipidemia LDL goal <100  - Lipid panel reflex to direct LDL Fasting    6. Age-related osteoporosis without current pathological fracture    7. Urgency incontinence    8. Anxiety    9. Status post total left knee replacement    10. Other specified hypothyroidism  - TSH with free T4 reflex    11. Essential hypertension with goal blood pressure less than 140/90  - CBC with platelets  - Comprehensive metabolic panel      Iglesia Moreno MD  [facility]    The information in this document, created by the medical scribe for me, accurately reflects the services I personally performed and the decisions made by me. I have reviewed and approved this document for accuracy prior to leaving the patient care area.  Iglesia Moreno MD  10:06  AM, 03/13/18

## 2018-03-13 NOTE — MR AVS SNAPSHOT
After Visit Summary   3/13/2018    Patricia dAen    MRN: [id number]           Patient Information     Date Of Birth          1945        Visit Information        Provider Department      3/13/2018 8:30 AM Iglesia Moreno MD [facility]        Today's Diagnoses     Type 2 diabetes mellitus with diabetic nephropathy, with long-term current use of insulin (H)    -  1    Type 2 diabetes mellitus with diabetic neuropathy, with long-term current use of insulin (H)        Thyroid nodule        Major depressive disorder, recurrent episode, moderate (H)        Hyperlipidemia LDL goal <100        Age-related osteoporosis without current pathological fracture        Urgency incontinence        Anxiety        Status post total left knee replacement        Other specified hypothyroidism        Essential hypertension with goal blood pressure less than 140/90           Follow-ups after your visit        Who to contact     If you have questions or need follow up information about today's clinic visit or your schedule please contact [facility] directly at [phone].  Normal or non-critical lab and imaging results will be communicated to you by SHAPEhart, letter or phone within 4 business days after the clinic has received the results. If you do not hear from us within 7 days, please contact the clinic through SHAPEhart or phone. If you have a critical or abnormal lab result, we will notify you by phone as soon as possible.  Submit refill requests through [employer] or call your pharmacy and they will forward the refill request to us. Please allow 3 business days for your refill to be completed.          Additional Information About Your Visit        MyChart Information     [employer] gives you secure access to your electronic health record. If you see a primary care provider, you can also send messages to your care team and make appointments. If you have questions, please call your primary care  "clinic.  If you do not have a primary care provider, please call [phone] and they will assist you.        Care EveryWhere ID     This is your Care EveryWhere ID. This could be used by other organizations to access your [facility] medical records  [id number]        Your Vitals Were     Pulse Temperature Height Pulse Oximetry BMI (Body Mass Index)       77 97.8  F (36.6  C) (Oral) 5' 5\" (1.651 m) 97% 28.46 kg/m2        Blood Pressure from Last 3 Encounters:   03/13/18 136/73   11/29/17 142/88   11/09/17 135/71    Weight from Last 3 Encounters:   03/13/18 171 lb (77.6 kg)   11/29/17 176 lb (79.8 kg)   11/09/17 171 lb (77.6 kg)              We Performed the Following     Albumin Random Urine Quantitative with Creat Ratio     C FOOT EXAM  NO CHARGE     CBC with platelets     Comprehensive metabolic panel     Hemoglobin A1c     Lipid panel reflex to direct LDL Fasting     T4 free     TDAP VACCINE (ADACEL)     TSH with free T4 reflex          Today's Medication Changes          These changes are accurate as of 3/13/18 11:59 PM.  If you have any questions, ask your nurse or doctor.               These medicines have changed or have updated prescriptions.        Dose/Directions    insulin detemir 100 UNIT/ML injection   Commonly known as:  LEVEMIR FLEXTOUCH   This may have changed:  See the new instructions.   Used for:  Type 2 diabetes mellitus with diabetic nephropathy, with long-term current use of insulin (H)   Changed by:  Iglesia Moreno MD        INJECT 41 UNITS UNDER THE SKIN EVERY NIGHT AT BEDTIME.   Quantity:  15 mL   Refills:  0            Where to get your medicines      These medications were sent to [facility] [address] [address], [place] [address]     Phone:  [phone]     insulin detemir 100 UNIT/ML injection                Primary Care Provider Office Phone # Fax #    Iglesia Moreno [phone]       " [address] [facility] [address] [facility] [address]  [facility] [address]        Equal Access to Services     GIO DAWKINS : Hadii trev cm hadjustagreyson Socarolinaali, waaxda luqadaha, qaybta kaalmada sergelaceygaro, joey sal rynegreg coronaselingiulia arias. So [facility] [phone].    ATENCIÓN: Si habla español, tiene a terrell disposición servicios gratuitos de asistencia lingüística. Llame al [phone].    We comply with applicable federal civil rights laws and [place] laws. We do not discriminate on the basis of race, color, national origin, age, disability, sex, sexual orientation, or gender identity.            Thank you!     Thank you for choosing [facility]  for your care. Our goal is always to provide you with excellent care. Hearing back from our patients is one way we can continue to improve our services. Please take a few minutes to complete the written survey that you may receive in the mail after your visit with us. Thank you!             Your Updated Medication List - Protect others around you: Learn how to safely use, store and throw away your medicines at www.disposemymeds.org.          This list is accurate as of 3/13/18 11:59 PM.  Always use your most recent med list.                   Brand Name Dispense Instructions for use Diagnosis    ACETAMINOPHEN PO      Take 500 mg by mouth        BD NILDA U/F 32G X 4 MM   Generic drug:  insulin pen needle     100 each    USE 1 TO 2 DAILY OR AS DIRECTED WITH LEVEMIR    Type 2 diabetes mellitus with diabetic neuropathy (H)       blood glucose monitoring lancets     1 Box    Use to test blood sugars 3 times daily or as directed.    Type 2 diabetes, HbA1C goal < 8% (H)       blood glucose monitoring test strip    ONETOUCH ULTRA    100 strip    Use to test blood sugar 3 times daily or as directed.    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    Type 2 diabetes mellitus with diabetic nephropathy, with long-term current use of insulin (H)       metFORMIN 1000 MG tablet    GLUCOPHAGE    180 tablet    Take 1 tablet (1,000 mg) by mouth 2 times daily (with meals)    Type 2 diabetes mellitus with diabetic nephropathy, with long-term current use of insulin (H)       metoprolol tartrate 100 MG tablet    LOPRESSOR    180 tablet    Take 1 tablet (100 mg) by mouth 2 times daily    Essential hypertension       MULTIVITAMIN & MINERAL PO      Take 1 tablet by mouth every evening        nystatin cream    MYCOSTATIN    60 g    Apply topically 3 times daily    Candidiasis of skin       OMEGA-3 FISH OIL PO      Take 1,200 mg by mouth every evening        omeprazole 20 MG CR capsule    priLOSEC    90 capsule    Take 1 capsule (20 mg) by mouth daily    Gastroesophageal reflux disease with esophagitis       QUEtiapine 50 MG tablet    SEROQUEL    90 tablet    Take 1 tablet (50 mg) by mouth At Bedtime    Major depressive disorder, recurrent episode, moderate (H)       simvastatin 80 MG tablet    ZOCOR    45 tablet    Take 0.5 tablets (40 mg) by mouth At Bedtime    Hyperlipidemia LDL goal <100       venlafaxine 150 MG 24 hr capsule    EFFEXOR-XR    90 capsule    Take 1 capsule (150 mg) by mouth daily With breakfast    Major depressive disorder, recurrent episode, moderate (H)       VITAMIN D (CHOLECALCIFEROL) PO       Take 5,000 Units by mouth daily

## 2018-03-14 ASSESSMENT — PATIENT HEALTH QUESTIONNAIRE - PHQ9: SUM OF ALL RESPONSES TO PHQ QUESTIONS 1-9: 16

## 2018-03-20 RX ORDER — VENLAFAXINE HYDROCHLORIDE 150 MG/1
CAPSULE, EXTENDED RELEASE ORAL
Qty: 90 CAPSULE | Refills: 3 | Status: SHIPPED | OUTPATIENT
Start: 2018-03-20 | End: 2019-03-07

## 2018-03-20 NOTE — TELEPHONE ENCOUNTER
Routing refill request to provider for review/approval because:  Pt was just in 3/13 and PHQ9 16.  Please authorize if appropriate.  Thanks,  Karla Wright RN

## 2018-03-22 ENCOUNTER — TELEPHONE (OUTPATIENT)
Dept: FAMILY MEDICINE | Facility: CLINIC | Age: 73
End: 2018-03-22

## 2018-03-22 NOTE — TELEPHONE ENCOUNTER
Pt is past due for fu pap smear  Reminder letter was sent 8/23/17  LMTC and schedule at [facility]  Left this writers number in case of questions  If no reply and/or appt within two weeks (4/5/18) patient will be considered lost to pap tracking f/u.  Shayna Ferreira,   Pap Tracking

## 2018-03-31 ENCOUNTER — HEALTH MAINTENANCE LETTER (OUTPATIENT)
Age: 73
End: 2018-03-31

## 2018-04-10 DIAGNOSIS — E78.5 HYPERLIPIDEMIA LDL GOAL <100: ICD-10-CM

## 2018-04-10 NOTE — TELEPHONE ENCOUNTER
Simvastatin  Last Written Prescription Date:  05/02/2017  Last Fill Quantity: 45,  # refills: 3   Last office visit: 3/13/2018 with prescribing provider:     Future Office Visit:    Requested Prescriptions   Pending Prescriptions Disp Refills     simvastatin (ZOCOR) 80 MG tablet 45 tablet 3     Sig: Take 0.5 tablets (40 mg) by mouth At Bedtime    There is no refill protocol information for this order

## 2018-04-10 NOTE — TELEPHONE ENCOUNTER
Reason for Call:  Medication or medication refill:    Do you use a [facility]?  Name of the pharmacy and phone number for the current request:      [facility] [id number] [place], [place] - [address]      Name of the medication requested: simvastatin (ZOCOR) 80 MG tablet      Other request: please refill    Can we leave a detailed message on this number? YES    Phone number patient can be reached at: simvastatin (ZOCOR) 80 MG tablet    Best Time: anytime    Call taken on 4/10/2018 at 3:22 PM by Saadia Waters

## 2018-04-11 RX ORDER — SIMVASTATIN 80 MG
40 TABLET ORAL AT BEDTIME
Qty: 45 TABLET | Refills: 1 | Status: SHIPPED | OUTPATIENT
Start: 2018-04-11 | End: 2018-07-19

## 2018-04-11 NOTE — TELEPHONE ENCOUNTER
Prescription approved per [facility] Refill Protocol.    Mandy Orozco RN   [facility]

## 2018-04-14 ENCOUNTER — HEALTH MAINTENANCE LETTER (OUTPATIENT)
Age: 73
End: 2018-04-14

## 2018-04-17 NOTE — NURSING NOTE
"Chief Complaint   Patient presents with     New Patient       Initial BP (!) 151/94 (BP Location: Left arm, Patient Position: Sitting, Cuff Size: Adult Regular)  Pulse 117  Temp 98.3  F (36.8  C) (Oral)  Ht 5' 5\" (1.651 m)  Wt 170 lb (77.1 kg)  SpO2 96%  Breastfeeding? No  BMI 28.29 kg/m2 Estimated body mass index is 28.29 kg/(m^2) as calculated from the following:    Height as of this encounter: 5' 5\" (1.651 m).    Weight as of this encounter: 170 lb (77.1 kg).  Medication Reconciliation: complete   Gisell Adams- GUILLERMO      " left foot sx crutch training

## 2018-04-20 DIAGNOSIS — Z79.4 TYPE 2 DIABETES MELLITUS WITH DIABETIC NEPHROPATHY, WITH LONG-TERM CURRENT USE OF INSULIN (H): ICD-10-CM

## 2018-04-20 DIAGNOSIS — E11.21 TYPE 2 DIABETES MELLITUS WITH DIABETIC NEPHROPATHY, WITH LONG-TERM CURRENT USE OF INSULIN (H): ICD-10-CM

## 2018-04-21 NOTE — TELEPHONE ENCOUNTER
"Requested Prescriptions   Pending Prescriptions Disp Refills     glipiZIDE (GLUCOTROL XL) 10 MG 24 hr tablet [Pharmacy Med Name: GLIPIZIDE ER 10MG TABLETS] 180 tablet 0    Last Written Prescription Date:  05/02/2017  Last Fill Quantity: 180 tablet,  # refills: 1   Last office visit: 3/13/2018 with prescribing provider:  03/13/2018   Future Office Visit:     Sig: TAKE 1 TABLET(10 MG) BY MOUTH TWICE DAILY    Sulfonylurea Agents Passed    4/20/2018  6:26 PM       Passed - Blood pressure less than 140/90 in past 6 months    BP Readings from Last 3 Encounters:   03/13/18 136/73   11/29/17 142/88   11/09/17 135/71                Passed - Patient has documented LDL within the past 12 mos.    Recent Labs   Lab Test  03/13/18   0946   LDL  62            Passed - Patient has had a Microalbumin in the past 12 mos.    Recent Labs   Lab Test  03/13/18   0946   MICROL  194   UMALCR  49.36*            Passed - Patient has documented A1c within the specified period of time.    Recent Labs   Lab Test  03/13/18   0946   A1C  7.9*            Passed - Patient is age 18 or older       Passed - No active pregnancy on record       Passed - Patient has a recent creatinine (normal) within the past 12 mos.    Recent Labs   Lab Test  03/13/18   0946   CR  0.68            Passed - Patient has not had a positive pregnancy test within the past 12 mos.       Passed - Recent (6 mo) or future (30 days) visit within the authorizing provider's specialty    Patient had office visit in the last 6 months or has a visit in the next 30 days with authorizing provider or within the authorizing provider's specialty.  See \"Patient Info\" tab in inbasket, or \"Choose Columns\" in Meds & Orders section of the refill encounter.              Russell Breen XRT  "

## 2018-04-23 NOTE — TELEPHONE ENCOUNTER
Routing refill request to provider to review approval because:  Routed to new PCP, please advise refill    Lorenza Troncoso RN, BSN  Message handled by Nurse Triage.

## 2018-04-25 NOTE — TELEPHONE ENCOUNTER
Patient called to check on this prescription.  She is out of medication-took the last pill today.    Any way that this can get filled ASAP!  [phone]  Ok to leave a message

## 2018-04-26 RX ORDER — GLIPIZIDE 10 MG/1
TABLET, FILM COATED, EXTENDED RELEASE ORAL
Qty: 180 TABLET | Refills: 1 | Status: SHIPPED | OUTPATIENT
Start: 2018-04-26 | End: 2018-07-19

## 2018-05-22 DIAGNOSIS — F33.1 MAJOR DEPRESSIVE DISORDER, RECURRENT EPISODE, MODERATE (H): ICD-10-CM

## 2018-05-22 DIAGNOSIS — E13.49 OTHER DIABETIC NEUROLOGICAL COMPLICATION ASSOCIATED WITH OTHER SPECIFIED DIABETES MELLITUS (H): ICD-10-CM

## 2018-05-22 DIAGNOSIS — E11.40 TYPE 2 DIABETES MELLITUS WITH DIABETIC NEUROPATHY (H): ICD-10-CM

## 2018-05-22 RX ORDER — PEN NEEDLE, DIABETIC 32GX 5/32"
NEEDLE, DISPOSABLE MISCELLANEOUS
Qty: 100 EACH | Refills: 0 | Status: CANCELLED | OUTPATIENT
Start: 2018-05-22

## 2018-05-23 ENCOUNTER — TELEPHONE (OUTPATIENT)
Dept: FAMILY MEDICINE | Facility: CLINIC | Age: 73
End: 2018-05-23

## 2018-05-23 DIAGNOSIS — E11.40 TYPE 2 DIABETES MELLITUS WITH DIABETIC NEUROPATHY (H): ICD-10-CM

## 2018-05-23 RX ORDER — GABAPENTIN 300 MG/1
CAPSULE ORAL
Qty: 240 CAPSULE | Refills: 3 | Status: SHIPPED | OUTPATIENT
Start: 2018-05-23 | End: 2018-06-01

## 2018-05-23 NOTE — TELEPHONE ENCOUNTER
Prescription approved per [facility] Refill Protocol.  Courtney Handley RN

## 2018-05-23 NOTE — TELEPHONE ENCOUNTER
Requested Prescriptions   Pending Prescriptions Disp Refills     gabapentin (NEURONTIN) 300 MG capsule 240 capsule 3    There is no refill protocol information for this order        Routing refill request to provider for review/approval because:  Drug not on the [facility] refill protocol     Sonia Malloy RN

## 2018-05-23 NOTE — TELEPHONE ENCOUNTER
Reason for Call:  Medication or medication refill:    Do you use a [facility]?  Name of the pharmacy and phone number for the current request:  [id number] [address] [phone]    Name of the medication requested: BD NILDA U/F 32G X 4 MM insulin pen needle Patient only has one needle left.    Other request: Patient only has one needle left.   Also she had requested refill of bupropion,  Which looks like it went to previous Dr in [place].     Can we leave a detailed message on this number? YES    Phone number patient can be reached at: Cell number on file:    Telephone Information:   Mobile [phone]       Best Time: asap    Call taken on 5/23/2018 at 3:44 PM by Tahira Johns

## 2018-05-24 DIAGNOSIS — F33.1 MAJOR DEPRESSIVE DISORDER, RECURRENT EPISODE, MODERATE (H): ICD-10-CM

## 2018-05-24 RX ORDER — BUPROPION HYDROCHLORIDE 100 MG/1
TABLET, EXTENDED RELEASE ORAL
Status: SHIPPED
Start: 2018-05-24 | End: 2018-07-13

## 2018-05-24 RX ORDER — BUPROPION HYDROCHLORIDE 100 MG/1
TABLET, EXTENDED RELEASE ORAL
Qty: 180 TABLET | Refills: 0 | Status: SHIPPED | OUTPATIENT
Start: 2018-05-24 | End: 2018-07-26

## 2018-05-24 NOTE — TELEPHONE ENCOUNTER
"Last OV with PCP 3/13/18    Requested Prescriptions   Pending Prescriptions Disp Refills     buPROPion (WELLBUTRIN SR) 100 MG 12 hr tablet [Pharmacy Med Name: BUPROPION SR 100MG TABLETS (12H)] 180 tablet 0     Sig: TAKE 1 TABLET(100 MG) BY MOUTH TWICE DAILY    SSRIs Protocol Failed    5/24/2018  1:49 PM       Failed - PHQ-9 score less than 5 in past 6 months    Please review last PHQ-9 score.          Passed - Medication is Bupropion    If the medication is Bupropion (Wellbutrin), and the patient is taking for smoking cessation; OK to refill.         Passed - Patient is age 18 or older       Passed - No active pregnancy on record       Passed - No positive pregnancy test in last 12 months       Passed - Recent (6 mo) or future (30 days) visit within the authorizing provider's specialty    Patient had office visit in the last 6 months or has a visit in the next 30 days with authorizing provider or within the authorizing provider's specialty.  See \"Patient Info\" tab in inbasket, or \"Choose Columns\" in Meds & Orders section of the refill encounter.            PHQ-9 SCORE 8/7/2017 10/26/2017 3/13/2018   Total Score - - -   Total Score 17 16 16       "

## 2018-05-24 NOTE — TELEPHONE ENCOUNTER
Routing refill request to provider for review/approval because:  Previously prescribed by former PCP  Last PHQ-9 score of 16    Heydi LAWRENCE RN

## 2018-05-29 DIAGNOSIS — E11.21 TYPE 2 DIABETES MELLITUS WITH DIABETIC NEPHROPATHY, WITH LONG-TERM CURRENT USE OF INSULIN (H): ICD-10-CM

## 2018-05-29 DIAGNOSIS — I10 ESSENTIAL HYPERTENSION: ICD-10-CM

## 2018-05-29 DIAGNOSIS — F33.1 MAJOR DEPRESSIVE DISORDER, RECURRENT EPISODE, MODERATE (H): ICD-10-CM

## 2018-05-29 DIAGNOSIS — Z79.4 TYPE 2 DIABETES MELLITUS WITH DIABETIC NEPHROPATHY, WITH LONG-TERM CURRENT USE OF INSULIN (H): ICD-10-CM

## 2018-05-30 RX ORDER — QUETIAPINE FUMARATE 50 MG/1
TABLET, FILM COATED ORAL
Qty: 90 TABLET | Refills: 0 | Status: SHIPPED | OUTPATIENT
Start: 2018-05-30 | End: 2018-07-26

## 2018-05-30 RX ORDER — METOPROLOL TARTRATE 100 MG
TABLET ORAL
Qty: 180 TABLET | Refills: 0 | Status: SHIPPED | OUTPATIENT
Start: 2018-05-30 | End: 2018-07-26

## 2018-05-30 NOTE — TELEPHONE ENCOUNTER
"Routing refill request to provider for review/approval because:  Last Rx's from outside provider Susan Haase Katelyn K, RN    Requested Prescriptions   Pending Prescriptions Disp Refills     metFORMIN (GLUCOPHAGE) 1000 MG tablet [Pharmacy Med Name: METFORMIN 1000MG TABLETS] 180 tablet 0     Sig: TAKE 1 TABLET(1000 MG) BY MOUTH TWICE DAILY WITH MEALS    Biguanide Agents Passed    5/29/2018  5:25 PM       Passed - Blood pressure less than 140/90 in past 6 months    BP Readings from Last 3 Encounters:   03/13/18 136/73   11/29/17 142/88   11/09/17 135/71                Passed - Patient has documented LDL within the past 12 mos.    Recent Labs   Lab Test  03/13/18   0946   LDL  62            Passed - Patient has had a Microalbumin in the past 12 mos.    Recent Labs   Lab Test  03/13/18   0946   MICROL  194   UMALCR  49.36*            Passed - Patient is age 10 or older       Passed - Patient has documented A1c within the specified period of time.    If HgbA1C is 8 or greater, it needs to be on file within the past 3 months.  If less than 8, must be on file within the past 6 months.     Recent Labs   Lab Test  03/13/18   0946   A1C  7.9*            Passed - Patient's CR is NOT>1.4 OR Patient's EGFR is NOT<45 within past 12 mos.    Recent Labs   Lab Test  03/13/18   0946   GFRESTIMATED  85   GFRESTBLACK  >90       Recent Labs   Lab Test  03/13/18   0946   CR  0.68            Passed - Patient does NOT have a diagnosis of CHF.       Passed - Patient is not pregnant       Passed - Patient has not had a positive pregnancy test within the past 12 mos.        Passed - Recent (6 mo) or future (30 days) visit within the authorizing provider's specialty    Patient had office visit in the last 6 months or has a visit in the next 30 days with authorizing provider or within the authorizing provider's specialty.  See \"Patient Info\" tab in inbasket, or \"Choose Columns\" in Meds & Orders section of the refill encounter.            " "QUEtiapine (SEROQUEL) 50 MG tablet [Pharmacy Med Name: QUETIAPINE 50MG TABLETS] 90 tablet 0     Sig: TAKE 1 TABLET(50 MG) BY MOUTH AT BEDTIME    Antipsychotic Medications Passed    5/29/2018  5:25 PM       Passed - Blood pressure under 140/90 in past 12 months    BP Readings from Last 3 Encounters:   03/13/18 136/73   11/29/17 142/88   11/09/17 135/71                Passed - Patient is 12 years of age or older       Passed - Lipid panel on file within the past 12 months    Recent Labs   Lab Test  03/13/18   0946   01/13/15   1139   CHOL  135   < >  155   TRIG  177*   < >  154*   HDL  38*   < >  47*   LDL  62   < >  77   NHDL  97   < >   --    VLDL   --    --   31*   CHOLHDLRATIO   --    --   3.3    < > = values in this interval not displayed.              Passed - CBC on file in past 12 months    Recent Labs   Lab Test  03/13/18   0946   WBC  7.7   RBC  4.46   HGB  13.2   HCT  41.3   PLT  249       For [facility] ONLY: [id number] = WBC, ZYKZ889 = RBC         Passed - Heart Rate on file within past 12 months    Pulse Readings from Last 3 Encounters:   03/13/18 77   11/29/17 82   11/09/17 71              Passed - A1c or Glucose on file in past 12 months    Recent Labs   Lab Test  03/13/18   0946   GLC  105*   A1C  7.9*       Please review patients last 3 weights. If a weight gain of >10 lbs exists, you may refill the prescription once after instructing the patient to schedule an appointment within the next 30 days.    Wt Readings from Last 3 Encounters:   03/13/18 171 lb (77.6 kg)   11/29/17 176 lb (79.8 kg)   11/09/17 171 lb (77.6 kg)            Passed - Patient is not pregnant       Passed - No positve pregnancy test on file in past 12 months       Passed - Recent (6 mo) or future (30 days) visit within the authorizing provider's specialty    Patient had office visit in the last 6 months or has a visit in the next 30 days with authorizing provider or within the authorizing provider's specialty.  See \"Patient Info\" tab in " "inbasket, or \"Choose Columns\" in Meds & Orders section of the refill encounter.            metoprolol tartrate (LOPRESSOR) 100 MG tablet [Pharmacy Med Name: METOPROLOL TARTRATE 100MG TABLETS] 180 tablet 0     Sig: TAKE 1 TABLET(100 MG) BY MOUTH TWICE DAILY    Beta-Blockers Protocol Passed    5/29/2018  5:25 PM       Passed - Blood pressure under 140/90 in past 12 months    BP Readings from Last 3 Encounters:   03/13/18 136/73   11/29/17 142/88   11/09/17 135/71                Passed - Patient is age 6 or older       Passed - Recent (12 mo) or future (30 days) visit within the authorizing provider's specialty    Patient had office visit in the last 12 months or has a visit in the next 30 days with authorizing provider or within the authorizing provider's specialty.  See \"Patient Info\" tab in inbasket, or \"Choose Columns\" in Meds & Orders section of the refill encounter.                "

## 2018-06-01 ENCOUNTER — TELEPHONE (OUTPATIENT)
Dept: FAMILY MEDICINE | Facility: CLINIC | Age: 73
End: 2018-06-01

## 2018-06-01 DIAGNOSIS — E13.49 OTHER DIABETIC NEUROLOGICAL COMPLICATION ASSOCIATED WITH OTHER SPECIFIED DIABETES MELLITUS (H): ICD-10-CM

## 2018-06-01 NOTE — TELEPHONE ENCOUNTER
Fax received from [facility] looking for clarification on patient, they could not find her records in their system.    I called patient since multiple medications in the last few weeks were refilled, all were sent to local [facility].      Patient does NOT use mail order pharmacy.  She only uses local [facility].    Repended gabapentin medication to be sent to [facility].  I told patient it might not get there until beginning of next week, she was fine with that.    Gretel Longoria, RT (R)

## 2018-06-04 RX ORDER — GABAPENTIN 300 MG/1
CAPSULE ORAL
Qty: 240 CAPSULE | Refills: 3 | Status: SHIPPED | OUTPATIENT
Start: 2018-06-04

## 2018-07-06 ENCOUNTER — PATIENT OUTREACH (OUTPATIENT)
Dept: CARE COORDINATION | Facility: CLINIC | Age: 73
End: 2018-07-06

## 2018-07-06 ENCOUNTER — TELEPHONE (OUTPATIENT)
Dept: FAMILY MEDICINE | Facility: CLINIC | Age: 73
End: 2018-07-06

## 2018-07-06 ENCOUNTER — NURSE TRIAGE (OUTPATIENT)
Dept: NURSING | Facility: CLINIC | Age: 73
End: 2018-07-06

## 2018-07-06 NOTE — TELEPHONE ENCOUNTER
Patient said she needs more Levemir insulin, but she has no money to pay for it. Denies that she has family or a friend that she could borrow money from to pay for her insulin. She doesn't think that her credit card will work to pay for it. Nurse offered patient phone #s for low cost rx resources. She said that won't help, as she has no money. Then told nurse that she has a son that lives in [place]. Patient agreed to call her son to see if he would pay for her rx. Was told that her son could use a credit card over the phone to pay the pharmacy. Patient said she will call her son to discuss the issue.   Cristina Mills RN/FNA

## 2018-07-06 NOTE — PROGRESS NOTES
Clinic Care Coordination Contact  Care Team Conversations    CCRN spoke to Shayna Corral has spoken with patient today (documentation pending at this time) patient sounded confused per Shayna Corral RN reports that patient is calling today stating she is unable to fill her levemir and glipizide     Patient stated that she had no money to fill these medications     Chart reviewed     Patient last saw pcp on 3/13/18   Lab Results   Component Value Date    A1C 7.9 03/13/2018    A1C 9.3 10/26/2017    A1C 9.7 08/07/2017    A1C 9.4 05/02/2017    A1C 11.1 01/30/2017     Patient had refills of medications per chart review - CCRN placed call to Jenifer in [place]   1 refill exists on levemir - last fill 5/22   1 refill of glipizide however patient is unable to fill as it is to soon, she can fill on 7/16/18     CCRN advised that we could have patient apply for patient assistance program - however that will not be an immediate answer for her to get her meds today.     It appears that patient has been out of her insulin for a while? It is unclear what her blood sugar is at this time - Shayna NEIL was going to ask patient to check glucose while on the phone with her and discuss ER for eval     CCRN will review next business day - and send to Isamar SANCHEZ at that time if needed     Shoshana Cook Care Coordinator RN  [facility]  [phone]  July 6, 2018

## 2018-07-06 NOTE — TELEPHONE ENCOUNTER
"The Pt called in to report she cannot pay for her medications this month due to \"going negative\" in her checking account.   She reports being out of her Glipizide as well as her Levemir medication. I spoke with the Care Coordinator Shoshana here at the  clinic.   Not likely [facility] pharmacy assistance program can wrap this up tonight, Pt needs to fill out application first, clinic closed now.   Per pharmacy records the Pt should have enough Glipizide to get her out until 7/26/18. She reports not having any.   Levemir was last filled on 5/22/18. She reports she is out. Unsure of what BG has been running. She needs to follow up with PCP regarding diabetes. See's Isamar provider.   Pt sounds confused over the phone, not sure where her medications are, does not recall her phone number. Not sure if she is taking her sugars consistently at home.     Huddled with Care Coordination. RN's advise ER visit. I tried calling the Pt to discuss. Call went to  3 times in a row. Left detailed message with above instructions.   Will send to  [facility] to follow up with the Pt on Monday.     Shayna Gomes, RN -- [facility]         "

## 2018-07-09 NOTE — PROGRESS NOTES
Clinic Care Coordination Contact  [facility]/Voicemail    Referral Source: Care Team (triage JOLYNN Corral )  Clinical Data: Care Coordinator Outreach - CCRN placed call to patient to follow up on calls and concerns from 7/6 - first call someone answered the phone and after CCRN explained who she was the phone was hung up   - second call back received voicemail - message left for patient to return call to CCRN     CCRN did discuss with Renata COOKN for pcp clinic     Outreach attempted x 1.  Left message on voicemail with call back information and requested return call.  Plan: Care Coordinator will try to reach patient again in 1-2 business days.    Shoshana Cook Care Coordinator RN  [facility]  [phone]  July 9, 2018

## 2018-07-11 ENCOUNTER — PATIENT OUTREACH (OUTPATIENT)
Dept: CARE COORDINATION | Facility: CLINIC | Age: 73
End: 2018-07-11

## 2018-07-11 ASSESSMENT — ACTIVITIES OF DAILY LIVING (ADL): DEPENDENT_IADLS:: INDEPENDENT

## 2018-07-11 NOTE — PROGRESS NOTES
"Clinic Care Coordination Contact    Clinic Care Coordination Contact  OUTREACH    Referral Information:  Referral Source: Care Team    Primary Diagnosis: Diabetes    Chief Complaint   Patient presents with     Clinic Care Coordination - Initial     Care Team Referral         [place] Utilization: Unsure of the patient's follow up at this time. Patient has had multiple no show appointments.   Difficulty keeping appointments:: Yes  Utilization    Last refreshed: 7/11/2018  8:35 AM:  No Show Count (past year) 3       Last refreshed: 7/11/2018  8:35 AM:  ED visits 0       Last refreshed: 7/11/2018  8:35 AM:  Hospital admissions 0          Current as of: 7/11/2018  8:35 AM             Clinical Concerns:  Current Medical Concerns: Patient with complex medical concerns including diabetes has been out of her medications for multiple days. Patient has not been monitoring her blood sugars at this time. Patient stated she has increased thirst, increased urination, feels blah, dry mouth, and a dull headache at times. Patient encouraged to come into the clinic so her blood sugar can be taken but the patient does not have transportation. RNCC received permission to talk with her son and daughter in law so a call was placed. They were going to call another family member to see if they could bring her into her appointment. Patient denied any blurry vision and stated \"I just had my eyes checked\". Will discuss this with her PCP and hopefully get the patient into the clinic soon.   Current Behavioral Concerns: Patient has a history of depression. PAtient unsure if she feels bad due to her blood sugar or her depression. Patient manages this with medications (effexor and  Seroquel) Patient has been out of her Effexor for some time. Patient encouraged to come into the clinic for follow up.   Education Provided to patient: Care Coordination. Diabetes management. Proper Nutrition.   Pain  Chronic pain (GOAL):: No  Health Maintenance " Reviewed: Due/Overdue   Clinical Pathway: Clinic Care Coordination Diabetes Assessment    Diabetes type: Type 2  What is the duration of your diabetes?    What is the state of your diabetes? Getting harder to manage  Have you received Diabetes education in the past 24 months? No    Symptoms:  Do you have:  Symptoms  Fatigue: Yes  Polydipsia: Yes  Polyuria: Yes  Visual change: Yes  Symptom course: Worsening    Diabetic Complications:   Diabetic Complications  Peripheral neuropathy: Yes    Diabetes Risks: Age over 45 years    Current Treatments: Insulin Injections      Insulin Regimen:   Patient is currently out of her insulin. Patient stated she has not had insulin in 4 days. Per chart review RN CC from another clinic called [facility] and her levemir was last refilled on . Glipizide is able to be filled on .   Patient does not have funds to pay for these medications at this time.        Monitoring Regimen:   Patient is not currently checking her blood sugars. Patient stated her strips have  and her machine won't turn on.      Medication Management:  Reviewed. Patient is out of multiple prescriptions at this time. Patient encouraged to come into the clinic for follow up and to discuss her medications. Patient stated that she does not have the funds to get her prescriptions. Per the patient and family they are sending her some money but not until the . Will address this concern with the PCP and pull social work in if needed.     Functional Status:  Dependent ADLs:: Independent  Dependent IADLs:: Independent  Bed or wheelchair confined:: No  Mobility Status: Independent  Fallen 2 or more times in the past year?: Yes  Any fall with injury in the past year?: Yes    Living Situation:  Current living arrangement:: I live alone    Diet/Exercise/Sleep:  Diet:: Diabetic diet  Inadequate nutrition (GOAL):: No  Exercise:: Currently not exercising  Inadequate activity/exercise (GOAL):: No  Significant changes  in sleep pattern (GOAL): No    Transportation:  Transportation concerns (GOAL):: Yes        Psychosocial:  Adventism or spiritual beliefs that impact treatment:: No  Mental health DX:: Yes  Mental health DX how managed:: Medication  Mental health management concern (GOAL):: No  Informal Support system:: Family     Financial/Insurance: [employer]BS Platinum Blue   Financial/Insurance concerns (GOAL):: Yes  Patient currently does not have money in her account. Per the patient her bank withdrew funds that put her into the red. Patient stated her family will be sending her money. RN CC spoke with her son who stated he will send money but not until the 13th. Will have to address her financial concerns with social work and the patient moving forward.      Resources and Interventions:  Community Resources: None  Supplies used at home:: Diabetic Supplies  Equipment Currently Used at Home: glucometer    Advance Care Plan/Directive  Advanced Care Plans/Directives on file:: No  Advanced Care Plan/Directive Status: Declined Further Information          Goals:   Goals        General    1. Medication 1 (pt-stated)     Notes - Note created  7/11/2018  8:34 AM by Karen Lyons RN    Goal Statement: I will work with Care Coordination to help me with my medication management   Measure of Success: Patient will obtain all of her prescriptions   Supportive Steps to Achieve: RN CC will speak with PCP. Touch base with Diabetes Education. Include social work   Barriers: Unsure of the patient's motivation   Strengths: Family supportive.   Date to Achieve By: Ongoing.         2. Medical (pt-stated)     Notes - Note created  7/11/2018  8:37 AM by Karen Lyons RN    Goal Statement: I will come into the clinic for an appointment   Measure of Success: Patient will set up appointment and attend.   Supportive Steps to Achieve: RN CC will help facilitate appointment  Barriers: No transportation. Unsure of her motivation level.   Strengths:  Family calling family who lives in [place] to help with transportation.   Date to Achieve By: Sept 1, 2018              Patient/Caregiver understanding: Patient verbalized understanding, engaged in AIDET communication behavior during encounter.    Outreach Frequency: monthly      Plan:  1. RN CC will work with the PCP to get the patient into the clinic.   2. RN CC will work with family and the clinic to help with transportation to her appointment.  3. RN CC will consult diabetes education on help with medications/glucometer.   4. RN CC will follow up with the patient moving forward.     Renata Barger RN  Clinic Care Coordinator  [phone]  [email or web address]

## 2018-07-11 NOTE — PROGRESS NOTES
Clinic Care Coordination Contact    RN CC spoke with the patient's PCP regarding the need for her to come in. PAtient had not been seen since March. At that time her A1C was 7.9. PCP on board with getting the patient into the clinic. Once the patient is in, RN CC and SW will talk with the patient to help prevent this happening again and provide resources and education.     RN CC placed a call to the patient to discuss our options. No answer. Will try again in 1-2 business days.     Renata Barger RN  Clinic Care Coordinator  [phone]  [email or web address]

## 2018-07-11 NOTE — PROGRESS NOTES
"Clinic Care Coordination Contact    RN CC called the patient's son and daughter in law this morning to follow up on our conversation from last evening. RN CC spoke with Latosha. Per Latosha the patient's family in [place] is unable to provide her transportation for a clinic appointment. Latosha stated the patient was going to talk with \"senior link\" to see what they offered. Unsure what senior link is at this time. Possible Senior linkage line. RN CC explained to latosha that there is a possibility that the clinic could provide a taxi voucher if the patient is willing to come in.     Latosha had some concerns regarding the patient. Per Latosha, they have noticed some cognitive changes. They noticed she has been forgetting more lately. An example provided by latosha is regarding her money concerns. Latosha believes she did not remember she paid a bill already. Writer will update the PCP regarding this concern. Latosha did state that she has fallen and admitted to \"losing her balance\" at home. Writer asked Latosha if she felt the patient was safe in her home and latosha stated \"right now\". Latosha stated that her and the patient's son have contemplated moving her to [place] but the patient is hesitant due to her son being in a group home due to mental health issues at this time.     Latosha is on board with getting the patient into the clinic. Once the patient comes in RN CC will have her sign a consent to communicate again and Latosha and Octavio added.     No other concerns from Latosha at this time.     Renata Barger RN  Clinic Care Coordinator  [phone]  [email or web address]     "

## 2018-07-11 NOTE — PROGRESS NOTES
Clinic Care Coordination Contact  Care Team Conversations    This JOEYN talked with Karen SANCHEZ at [facility] where pcp is located     Karen SANCHEZ reached out to patient and was able to talk with her - Karen SANCHEZ has informed this writer that she will take over this case - no further outreach by this CCRN planned at this time     Shoshana Cook Care Coordinator RN  [facility] and [facility]  [phone]  July 11, 2018

## 2018-07-12 NOTE — PROGRESS NOTES
Clinic Care Coordination Contact  [facility]/Voicemail    Referral Source: Care Team    Clinical Data: Care Coordinator Outreach  Outreach attempted x 2.  Left message on voicemail with call back information and requested return call.  Plan:  Care Coordinator will try to reach patient again in 3-5 business days.    Renata Barger RN  Clinic Care Coordinator  [phone]  [email or web address]

## 2018-07-13 ENCOUNTER — APPOINTMENT (OUTPATIENT)
Dept: GENERAL RADIOLOGY | Facility: CLINIC | Age: 73
End: 2018-07-13
Attending: EMERGENCY MEDICINE
Payer: MEDICARE

## 2018-07-13 ENCOUNTER — APPOINTMENT (OUTPATIENT)
Dept: CT IMAGING | Facility: CLINIC | Age: 73
End: 2018-07-13
Attending: EMERGENCY MEDICINE
Payer: MEDICARE

## 2018-07-13 ENCOUNTER — HOSPITAL ENCOUNTER (OUTPATIENT)
Facility: CLINIC | Age: 73
Setting detail: OBSERVATION
Discharge: HOME | End: 2018-07-14
Attending: EMERGENCY MEDICINE | Admitting: INTERNAL MEDICINE
Payer: MEDICARE

## 2018-07-13 DIAGNOSIS — R73.9 HYPERGLYCEMIA: ICD-10-CM
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
## 2018-07-13 LAB
ALBUMIN UR-MCNC: 10 MG/DL
ANION GAP SERPL CALCULATED.3IONS-SCNC: 8 MMOL/L (ref 3–14)
APPEARANCE UR: CLEAR
BASOPHILS # BLD AUTO: 0 10E9/L (ref 0–0.2)
BASOPHILS NFR BLD AUTO: 0.4 %
BILIRUB UR QL STRIP: NEGATIVE
BUN SERPL-MCNC: 27 MG/DL (ref 7–30)
CALCIUM SERPL-MCNC: 9.7 MG/DL (ref 8.5–10.1)
CHLORIDE SERPL-SCNC: 103 MMOL/L (ref 94–109)
CO2 SERPL-SCNC: 26 MMOL/L (ref 20–32)
COLOR UR AUTO: ABNORMAL
CREAT SERPL-MCNC: 0.87 MG/DL (ref 0.52–1.04)
DIFFERENTIAL METHOD BLD: NORMAL
EOSINOPHIL # BLD AUTO: 0.4 10E9/L (ref 0–0.7)
EOSINOPHIL NFR BLD AUTO: 4.8 %
ERYTHROCYTE [DISTWIDTH] IN BLOOD BY AUTOMATED COUNT: 14.2 % (ref 10–15)
GFR SERPL CREATININE-BSD FRML MDRD: 64 ML/MIN/1.7M2
GLUCOSE BLDC GLUCOMTR-MCNC: 345 MG/DL (ref 70–99)
GLUCOSE BLDC GLUCOMTR-MCNC: 358 MG/DL (ref 70–99)
GLUCOSE BLDC GLUCOMTR-MCNC: 359 MG/DL (ref 70–99)
GLUCOSE SERPL-MCNC: 442 MG/DL (ref 70–99)
GLUCOSE UR STRIP-MCNC: >1000 MG/DL
HBA1C MFR BLD: 9.8 % (ref 0–5.6)
HCT VFR BLD AUTO: 37.1 % (ref 35–47)
HGB BLD-MCNC: 12.6 G/DL (ref 11.7–15.7)
HGB UR QL STRIP: NEGATIVE
IMM GRANULOCYTES # BLD: 0 10E9/L (ref 0–0.4)
IMM GRANULOCYTES NFR BLD: 0.1 %
INTERPRETATION ECG - MUSE: NORMAL
KETONES UR STRIP-MCNC: 40 MG/DL
LEUKOCYTE ESTERASE UR QL STRIP: ABNORMAL
LYMPHOCYTES # BLD AUTO: 1.9 10E9/L (ref 0.8–5.3)
LYMPHOCYTES NFR BLD AUTO: 26.2 %
MCH RBC QN AUTO: 29.4 PG (ref 26.5–33)
MCHC RBC AUTO-ENTMCNC: 34 G/DL (ref 31.5–36.5)
MCV RBC AUTO: 87 FL (ref 78–100)
MONOCYTES # BLD AUTO: 0.4 10E9/L (ref 0–1.3)
MONOCYTES NFR BLD AUTO: 6 %
MUCOUS THREADS #/AREA URNS LPF: PRESENT /LPF
NEUTROPHILS # BLD AUTO: 4.5 10E9/L (ref 1.6–8.3)
NEUTROPHILS NFR BLD AUTO: 62.5 %
NITRATE UR QL: NEGATIVE
NRBC # BLD AUTO: 0 10*3/UL
NRBC BLD AUTO-RTO: 0 /100
PH UR STRIP: 5 PH (ref 5–7)
PLATELET # BLD AUTO: 166 10E9/L (ref 150–450)
POTASSIUM SERPL-SCNC: 4 MMOL/L (ref 3.4–5.3)
RBC # BLD AUTO: 4.29 10E12/L (ref 3.8–5.2)
RBC #/AREA URNS AUTO: 1 /HPF (ref 0–2)
SODIUM SERPL-SCNC: 137 MMOL/L (ref 133–144)
SOURCE: ABNORMAL
SP GR UR STRIP: 1.02 (ref 1–1.03)
UROBILINOGEN UR STRIP-MCNC: NORMAL MG/DL (ref 0–2)
WBC # BLD AUTO: 7.3 10E9/L (ref 4–11)
WBC #/AREA URNS AUTO: 4 /HPF (ref 0–5)

## 2018-07-13 PROCEDURE — A9270 NON-COVERED ITEM OR SERVICE: HCPCS | Mod: GY | Performed by: INTERNAL MEDICINE

## 2018-07-13 PROCEDURE — G0378 HOSPITAL OBSERVATION PER HR: HCPCS

## 2018-07-13 PROCEDURE — 83036 HEMOGLOBIN GLYCOSYLATED A1C: CPT | Performed by: EMERGENCY MEDICINE

## 2018-07-13 PROCEDURE — 00000146 ZZHCL STATISTIC GLUCOSE BY METER IP

## 2018-07-13 PROCEDURE — 99220 ZZC INITIAL OBSERVATION CARE,LEVL III: CPT | Performed by: INTERNAL MEDICINE

## 2018-07-13 PROCEDURE — 96372 THER/PROPH/DIAG INJ SC/IM: CPT

## 2018-07-13 PROCEDURE — 25000132 ZZH RX MED GY IP 250 OP 250 PS 637: Mod: GY | Performed by: INTERNAL MEDICINE

## 2018-07-13 PROCEDURE — 99285 EMERGENCY DEPT VISIT HI MDM: CPT | Mod: 25

## 2018-07-13 PROCEDURE — 85025 COMPLETE CBC W/AUTO DIFF WBC: CPT | Performed by: EMERGENCY MEDICINE

## 2018-07-13 PROCEDURE — 81001 URINALYSIS AUTO W/SCOPE: CPT | Performed by: EMERGENCY MEDICINE

## 2018-07-13 PROCEDURE — 25000131 ZZH RX MED GY IP 250 OP 636 PS 637: Mod: GY | Performed by: INTERNAL MEDICINE

## 2018-07-13 PROCEDURE — 93005 ELECTROCARDIOGRAM TRACING: CPT

## 2018-07-13 PROCEDURE — 25000128 H RX IP 250 OP 636: Performed by: INTERNAL MEDICINE

## 2018-07-13 PROCEDURE — 70450 CT HEAD/BRAIN W/O DYE: CPT

## 2018-07-13 PROCEDURE — 80048 BASIC METABOLIC PNL TOTAL CA: CPT | Performed by: EMERGENCY MEDICINE

## 2018-07-13 PROCEDURE — 73502 X-RAY EXAM HIP UNI 2-3 VIEWS: CPT

## 2018-07-13 RX ORDER — DEXTROSE MONOHYDRATE 25 G/50ML
25-50 INJECTION, SOLUTION INTRAVENOUS
Status: DISCONTINUED | OUTPATIENT
Start: 2018-07-13 | End: 2018-07-14 | Stop reason: HOSPADM

## 2018-07-13 RX ORDER — ACETAMINOPHEN 325 MG/1
650 TABLET ORAL EVERY 4 HOURS PRN
Status: DISCONTINUED | OUTPATIENT
Start: 2018-07-13 | End: 2018-07-14 | Stop reason: HOSPADM

## 2018-07-13 RX ORDER — NICOTINE POLACRILEX 4 MG
15-30 LOZENGE BUCCAL
Status: DISCONTINUED | OUTPATIENT
Start: 2018-07-13 | End: 2018-07-14 | Stop reason: HOSPADM

## 2018-07-13 RX ORDER — ONDANSETRON 4 MG/1
4 TABLET, ORALLY DISINTEGRATING ORAL EVERY 6 HOURS PRN
Status: DISCONTINUED | OUTPATIENT
Start: 2018-07-13 | End: 2018-07-14 | Stop reason: HOSPADM

## 2018-07-13 RX ORDER — AMOXICILLIN 250 MG
1 CAPSULE ORAL 2 TIMES DAILY PRN
Status: DISCONTINUED | OUTPATIENT
Start: 2018-07-13 | End: 2018-07-14 | Stop reason: HOSPADM

## 2018-07-13 RX ORDER — ACETAMINOPHEN 650 MG/1
650 SUPPOSITORY RECTAL EVERY 4 HOURS PRN
Status: DISCONTINUED | OUTPATIENT
Start: 2018-07-13 | End: 2018-07-14 | Stop reason: HOSPADM

## 2018-07-13 RX ORDER — POLYETHYLENE GLYCOL 3350 17 G/17G
17 POWDER, FOR SOLUTION ORAL DAILY PRN
Status: DISCONTINUED | OUTPATIENT
Start: 2018-07-13 | End: 2018-07-14 | Stop reason: HOSPADM

## 2018-07-13 RX ORDER — QUETIAPINE FUMARATE 25 MG/1
50 TABLET, FILM COATED ORAL AT BEDTIME
Status: DISCONTINUED | OUTPATIENT
Start: 2018-07-13 | End: 2018-07-14 | Stop reason: HOSPADM

## 2018-07-13 RX ORDER — SIMVASTATIN 40 MG
40 TABLET ORAL AT BEDTIME
Status: DISCONTINUED | OUTPATIENT
Start: 2018-07-13 | End: 2018-07-14 | Stop reason: HOSPADM

## 2018-07-13 RX ORDER — AMOXICILLIN 250 MG
2 CAPSULE ORAL 2 TIMES DAILY PRN
Status: DISCONTINUED | OUTPATIENT
Start: 2018-07-13 | End: 2018-07-14 | Stop reason: HOSPADM

## 2018-07-13 RX ORDER — METOPROLOL TARTRATE 50 MG
100 TABLET ORAL 2 TIMES DAILY
Status: DISCONTINUED | OUTPATIENT
Start: 2018-07-13 | End: 2018-07-14 | Stop reason: HOSPADM

## 2018-07-13 RX ORDER — NICOTINE POLACRILEX 4 MG
15-30 LOZENGE BUCCAL
Status: DISCONTINUED | OUTPATIENT
Start: 2018-07-13 | End: 2018-07-13

## 2018-07-13 RX ORDER — VENLAFAXINE HYDROCHLORIDE 75 MG/1
150 CAPSULE, EXTENDED RELEASE ORAL
Status: DISCONTINUED | OUTPATIENT
Start: 2018-07-14 | End: 2018-07-14 | Stop reason: HOSPADM

## 2018-07-13 RX ORDER — DEXTROSE MONOHYDRATE 25 G/50ML
25-50 INJECTION, SOLUTION INTRAVENOUS
Status: DISCONTINUED | OUTPATIENT
Start: 2018-07-13 | End: 2018-07-13

## 2018-07-13 RX ORDER — GLIPIZIDE 10 MG/1
10 TABLET, FILM COATED, EXTENDED RELEASE ORAL
Status: DISCONTINUED | OUTPATIENT
Start: 2018-07-14 | End: 2018-07-14 | Stop reason: HOSPADM

## 2018-07-13 RX ORDER — NALOXONE HYDROCHLORIDE 0.4 MG/ML
.1-.4 INJECTION, SOLUTION INTRAMUSCULAR; INTRAVENOUS; SUBCUTANEOUS
Status: DISCONTINUED | OUTPATIENT
Start: 2018-07-13 | End: 2018-07-14 | Stop reason: HOSPADM

## 2018-07-13 RX ORDER — BUPROPION HYDROCHLORIDE 100 MG/1
100 TABLET, EXTENDED RELEASE ORAL 2 TIMES DAILY
Status: DISCONTINUED | OUTPATIENT
Start: 2018-07-13 | End: 2018-07-14 | Stop reason: HOSPADM

## 2018-07-13 RX ORDER — GABAPENTIN 300 MG/1
600 CAPSULE ORAL 3 TIMES DAILY
Status: DISCONTINUED | OUTPATIENT
Start: 2018-07-13 | End: 2018-07-14 | Stop reason: HOSPADM

## 2018-07-13 RX ORDER — SODIUM CHLORIDE 9 MG/ML
INJECTION, SOLUTION INTRAVENOUS CONTINUOUS
Status: DISCONTINUED | OUTPATIENT
Start: 2018-07-13 | End: 2018-07-14 | Stop reason: HOSPADM

## 2018-07-13 RX ORDER — ONDANSETRON 2 MG/ML
4 INJECTION INTRAMUSCULAR; INTRAVENOUS EVERY 6 HOURS PRN
Status: DISCONTINUED | OUTPATIENT
Start: 2018-07-13 | End: 2018-07-14 | Stop reason: HOSPADM

## 2018-07-13 RX ADMIN — SODIUM CHLORIDE: 9 INJECTION, SOLUTION INTRAVENOUS at 20:49

## 2018-07-13 RX ADMIN — BUPROPION HYDROCHLORIDE 100 MG: 100 TABLET, FILM COATED, EXTENDED RELEASE ORAL at 20:50

## 2018-07-13 RX ADMIN — INSULIN DETEMIR 25 UNITS: 100 INJECTION, SOLUTION SUBCUTANEOUS at 22:00

## 2018-07-13 RX ADMIN — SIMVASTATIN 40 MG: 40 TABLET, FILM COATED ORAL at 22:00

## 2018-07-13 RX ADMIN — GABAPENTIN 600 MG: 300 CAPSULE ORAL at 20:50

## 2018-07-13 RX ADMIN — METOPROLOL TARTRATE 100 MG: 50 TABLET ORAL at 20:49

## 2018-07-13 RX ADMIN — QUETIAPINE FUMARATE 50 MG: 25 TABLET ORAL at 22:00

## 2018-07-13 ASSESSMENT — ENCOUNTER SYMPTOMS
BACK PAIN: 1
NECK PAIN: 0

## 2018-07-13 NOTE — IP AVS SNAPSHOT
[facility] Observation Unit    [address] [place] [id number]    Phone:  [phone]                                       After Visit Summary   7/13/2018    Patricia Aden    MRN: [id number]           After Visit Summary Signature Page     I have received my discharge instructions, and my questions have been answered. I have discussed any challenges I see with this plan with the nurse or doctor.    ..........................................................................................................................................  Patient/Patient Representative Signature      ..........................................................................................................................................  Patient Representative Print Name and Relationship to Patient    ..................................................               ................................................  Date                                            Time    ..........................................................................................................................................  Reviewed by Signature/Title    ...................................................              ..............................................  Date                                                            Time

## 2018-07-13 NOTE — ED NOTES
Bed: ED23  Expected date:   Expected time:   Means of arrival:   Comments:  442 99 Fall head injury hyperglycemia

## 2018-07-13 NOTE — ED NOTES
"[facility]  ED Nurse Handoff Report    ED Chief complaint: Fall (no LOC, not on thinners, R cheek pain, R shoulder pain, R lower lumbar sciatic pain)      ED Diagnosis:   Final diagnoses:   None       Code Status: Full Code    Allergies:   Allergies   Allergen Reactions     Fish Allergy Anaphylaxis     Starke roughy-anaphylaxsis     Penicillins Unknown       Activity level - Baseline/Home:  Stand with Assist of walker    Activity Level - Current:   Stand with Assist of walker     Needed?: No    Isolation: No  Infection: Not Applicable  Bariatric?: No    Vital Signs:   Vitals:    07/13/18 1545 07/13/18 1547 07/13/18 1548 07/13/18 1600   BP: 180/87 180/87 180/87 167/82   Resp:  18 16    Temp:  98  F (36.7  C) 98  F (36.7  C)    TempSrc:  Oral Oral    SpO2:  97% 98% 90%   Weight:   74.8 kg (165 lb)    Height:   1.651 m (5' 5\")        Cardiac Rhythm: ,        Pain level: 0-10 Pain Scale: 5    Is this patient confused?: No   [place] - Suicide Severity Rating Scale Completed?  Yes  If yes, what color did the patient score?  White    Patient Report: Initial Complaint: Fall  Focused Assessment:  72 year old female who presents alone to the Emergency Department for evaluation of a fall. The patient reports that she fell on the concrete in the side walk. She notes that she has been falling more frequently lately, and has pain on her right cheek, right shoulder and right lower lumbar.  She is not on blood thinner or aspirin . She denies neck pain or loss of consciousness  Tests Performed: Results for MADHAVI YOLY L (MRN [id number]) as of 7/13/2018 17:56   Ref. Range 7/13/2018 15:53 7/13/2018 16:47 7/13/2018 17:00 7/13/2018 17:08   Sodium Latest Ref Range: 133 - 144 mmol/L 137      Potassium Latest Ref Range: 3.4 - 5.3 mmol/L 4.0      Chloride Latest Ref Range: 94 - 109 mmol/L 103      Carbon Dioxide Latest Ref Range: 20 - 32 mmol/L 26      Urea Nitrogen Latest Ref Range: 7 - 30 mg/dL 27    "   Creatinine Latest Ref Range: 0.52 - 1.04 mg/dL 0.87      GFR Estimate Latest Ref Range: >60 mL/min/1.7m2 64      GFR Estimate If Black Latest Ref Range: >60 mL/min/1.7m2 77      Calcium Latest Ref Range: 8.5 - 10.1 mg/dL 9.7      Anion Gap Latest Ref Range: 3 - 14 mmol/L 8      Glucose Latest Ref Range: 70 - 99 mg/dL 442 (H)      WBC Latest Ref Range: 4.0 - 11.0 10e9/L 7.3      Hemoglobin Latest Ref Range: 11.7 - 15.7 g/dL 12.6      Hematocrit Latest Ref Range: 35.0 - 47.0 % 37.1      Platelet Count Latest Ref Range: 150 - 450 10e9/L 166      RBC Count Latest Ref Range: 3.8 - 5.2 10e12/L 4.29      MCV Latest Ref Range: 78 - 100 fl 87      MCH Latest Ref Range: 26.5 - 33.0 pg 29.4      MCHC Latest Ref Range: 31.5 - 36.5 g/dL 34.0      RDW Latest Ref Range: 10.0 - 15.0 % 14.2      Diff Method Unknown Automated Method      % Neutrophils Latest Units: % 62.5      % Lymphocytes Latest Units: % 26.2      % Monocytes Latest Units: % 6.0      % Eosinophils Latest Units: % 4.8      % Basophils Latest Units: % 0.4      % Immature Granulocytes Latest Units: % 0.1      Nucleated RBCs Latest Ref Range: 0 /100 0      Absolute Neutrophil Latest Ref Range: 1.6 - 8.3 10e9/L 4.5      Absolute Lymphocytes Latest Ref Range: 0.8 - 5.3 10e9/L 1.9      Absolute Monocytes Latest Ref Range: 0.0 - 1.3 10e9/L 0.4      Absolute Eosinophils Latest Ref Range: 0.0 - 0.7 10e9/L 0.4      Absolute Basophils Latest Ref Range: 0.0 - 0.2 10e9/L 0.0      Abs Immature Granulocytes Latest Ref Range: 0 - 0.4 10e9/L 0.0      Absolute Nucleated RBC Unknown 0.0      Color Urine Unknown   Light Yellow    Appearance Urine Unknown   Clear    Glucose Urine Latest Ref Range: NEG^Negative mg/dL   >1000 (A)    Bilirubin Urine Latest Ref Range: NEG^Negative    Negative    Ketones Urine Latest Ref Range: NEG^Negative mg/dL   40 (A)    Specific Gravity Urine Latest Ref Range: 1.003 - 1.035    1.022    pH Urine Latest Ref Range: 5.0 - 7.0 pH   5.0    Protein Albumin  Urine Latest Ref Range: NEG^Negative mg/dL   10 (A)    Urobilinogen mg/dL Latest Ref Range: 0.0 - 2.0 mg/dL   Normal    Nitrite Urine Latest Ref Range: NEG^Negative    Negative    Blood Urine Latest Ref Range: NEG^Negative    Negative    Leukocyte Esterase Urine Latest Ref Range: NEG^Negative    Trace (A)    Source Unknown   Midstream Urine    WBC Urine Latest Ref Range: 0 - 5 /HPF   4    RBC Urine Latest Ref Range: 0 - 2 /HPF   1    Mucous Urine Latest Ref Range: NEG^Negative /LPF   Present (A)    XR PELVIS AND HIP RIGHT 1 VIEW Unknown  Rpt     CT HEAD W/O CONTRAST Unknown    Rpt     Abnormal Results: see epic results  Treatments provided:     Family Comments: no family present    OBS brochure/video discussed/provided to patient: Yes    ED Medications: Medications - No data to display    Drips infusing?:  No    For the majority of the shift this patient was Green.   Interventions performed were n/a.    Severe Sepsis OR Septic Shock Diagnosis Present: No      ED NURSE PHONE NUMBER: *[phone]

## 2018-07-13 NOTE — IP AVS SNAPSHOT
MRN:[id number]                      After Visit Summary   7/13/2018    Patricia Aden    MRN: [id number]           Thank you!     Thank you for choosing [facility] for your care. Our goal is always to provide you with excellent care. Hearing back from our patients is one way we can continue to improve our services. Please take a few minutes to complete the written survey that you may receive in the mail after you visit with us. Thank you!        Patient Information     Date Of Birth          1945        About your hospital stay     You were admitted on:  July 13, 2018 [facility]    You were discharged on:  July 14, 2018        Reason for your hospital stay       You were admitted for evaluation of after a fall.    You fall was likely due to high blood sugar and not using your walker. Resume your home diabetic regimen. Be sure to take your medications everyday. Use your walker at home as well.    Follow up with your regular MD as scheduled on 7/19. I would encourage you to pursue moving to [place] to gain more support from your son.                  Who to Call     For medical emergencies, please call 911.  For non-urgent questions about your medical care, please call your primary care provider or clinic, [phone]          Attending Provider     Provider Specialty    Renata Arvizu MD Emergency Medicine    [facility]       Primary Care Provider Office Phone # Fax #    Iglesia Moreno [phone]      After Care Instructions     Activity       Your activity upon discharge: activity as tolerated            Diet       Follow this diet upon discharge: Orders Placed This Encounter      Moderate Consistent CHO Diet            Monitor and record       blood glucose 4 times a day, before meals and at bedtime                  Follow-up Appointments     Follow-up and recommended labs and tests        1. Follow  "up with your regular MD as scheduled 7/19                  Your next 10 appointments already scheduled     Jul 19, 2018  4:00 PM CDT   [facility] Visit with Ig[facility]    [address] [facility] [address] [phone]           Bring a current list of meds and any records pertaining to this visit. For Physicals, please bring immunization records and any forms needing to be filled out. Please arrive 10 minutes early to complete paperwork.              Pending Results     No orders found for last 3 day(s).            Statement of Approval     Ordered          07/14/18 1425  I have reviewed and agree with all the recommendations and orders detailed in this document.  EFFECTIVE NOW     Approved and electronically signed by:  Merlene Mays PA-C             Admission Information     Date & Time Provider Department Dept. Phone    7/13/2018 Ilan Archer MD [facility] [phone]      Your Vitals Were     Blood Pressure Temperature Respirations Height Weight Pulse Oximetry    141/70 (BP Location: Right arm) 95.7  F (35.4  C) (Oral) 16 1.651 m (5' 5\") 75 kg (165 lb 4.8 oz) 94%    BMI (Body Mass Index)                   27.51 kg/m2           [employer] Information     "[employer]" gives you secure access to your electronic health record. If you see a primary care provider, you can also send messages to your care team and make appointments. If you have questions, please call your primary care clinic.  If you do not have a primary care provider, please call [phone] and they will assist you.        Care EveryWhere ID     This is your Care EveryWhere ID. This could be used by other organizations to access your [place] medical records  [id number]        Equal Access to Services     GIO DAWKINS : sofía Israel, joey neri. So wadane " [phone].    ATENCIÓN: Si farida shannon, tiene a terrell disposición servicios gratuitos de asistencia lingüística. Corbin griffin [phone].    We comply with applicable federal civil rights laws and [place] laws. We do not discriminate on the basis of race, color, national origin, age, disability, sex, sexual orientation, or gender identity.               Review of your medicines      CONTINUE these medicines which may have CHANGED, or have new prescriptions. If we are uncertain of the size of tablets/capsules you have at home, strength may be listed as something that might have changed.        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   Quantity:  1 Box   Refills:  prn       blood glucose monitoring test strip   Commonly known as:  ONETOUCH ULTRA   Used for:  Type 2 diabetes, HbA1C goal < 8% (H)        Use to test blood sugar 3 times daily or as directed.   Quantity:  100 strip   Refills:  3       buPROPion 100 MG 12 hr tablet   Commonly known as:  WELLBUTRIN SR   Used for:  Major depressive disorder, recurrent episode, moderate (H)        TAKE 1 TABLET(100 MG) BY MOUTH TWICE DAILY   Quantity:  180 tablet   Refills:  0       glipiZIDE 10 MG 24 hr tablet   Commonly known as:  GLUCOTROL XL   Used for:  Type 2 diabetes mellitus with diabetic nephropathy,  with long-term current use of insulin (H)        TAKE 1 TABLET(10 MG) BY MOUTH TWICE DAILY   Quantity:  180 tablet   Refills:  1       IBUPROFEN PO        Dose:  200 mg   Take 200 mg by mouth as needed for moderate pain   Refills:  0       insulin detemir 100 UNIT/ML injection   Commonly known as:  LEVEMIR FLEXTOUCH   Used for:  Type 2 diabetes mellitus with diabetic nephropathy, with long-term current use of insulin (H)        INJECT 41 UNITS UNDER THE SKIN EVERY NIGHT AT BEDTIME.   Quantity:  15 mL   Refills:  0       insulin pen needle 32G X 4 MM   Commonly known as:  BD NILDA U/F   Used for:  Type 2 diabetes mellitus with diabetic neuropathy (H)        USE 1 TO 2 DAILY OR AS DIRECTED WITH LEVEMIR   Quantity:  100 each   Refills:  1       metFORMIN 1000 MG tablet   Commonly known as:  GLUCOPHAGE   Used for:  Type 2 diabetes mellitus with diabetic nephropathy, with long-term current use of insulin (H)        TAKE 1 TABLET(1000 MG) BY MOUTH TWICE DAILY WITH MEALS   Quantity:  180 tablet   Refills:  0       metoprolol tartrate 100 MG tablet   Commonly known as:  LOPRESSOR   Used for:  Essential hypertension        TAKE 1 TABLET(100 MG) BY MOUTH TWICE DAILY   Quantity:  180 tablet   Refills:  0       MULTIVITAMIN & MINERAL PO        Dose:  1 tablet   Take 1 tablet by mouth every evening   Refills:  0       OMEGA-3 FISH OIL PO   Indication:  High Amount of Cholesterol in the Blood        Dose:  1200 mg   Take 1,200 mg by mouth every evening   Refills:  0       omeprazole 20 MG CR capsule   Commonly known as:  priLOSEC   Used for:  Gastroesophageal reflux disease with esophagitis        TAKE ONE CAPSULE BY MOUTH DAILY   Quantity:  90 capsule   Refills:  3       QUEtiapine 50 MG tablet   Commonly known as:  SEROquel   Used for:  Major depressive disorder, recurrent episode, moderate (H)        TAKE 1 TABLET(50 MG) BY MOUTH AT BEDTIME   Quantity:  90 tablet   Refills:  0       simvastatin 80 MG tablet   Commonly known as:   ZOCOR   Used for:  Hyperlipidemia LDL goal <100        Dose:  40 mg   Take 0.5 tablets (40 mg) by mouth At Bedtime   Quantity:  45 tablet   Refills:  1       venlafaxine 150 MG 24 hr capsule   Commonly known as:  EFFEXOR-XR   Used for:  Major depressive disorder, recurrent episode, moderate (H)        TAKE 1 CAPSULE(150 MG) BY MOUTH DAILY WITH BREAKFAST   Quantity:  90 capsule   Refills:  3                Protect others around you: Learn how to safely use, store and throw away your medicines at www.disposemymeds.org.             Medication List: This is a list of all your medications and when to take them. Check marks below indicate your daily home schedule. Keep this list as a reference.      Medications           Morning Afternoon Evening Bedtime As Needed    ACETAMINOPHEN PO   Take 500 mg by mouth as needed   Last time this was given:  650 mg on 7/14/2018 12:00 AM                                   blood glucose monitoring lancets   Use to test blood sugars 3 times daily or as directed.                                blood glucose monitoring test strip   Commonly known as:  ONETOUCH ULTRA   Use to test blood sugar 3 times daily or as directed.                                buPROPion 100 MG 12 hr tablet   Commonly known as:  WELLBUTRIN SR   TAKE 1 TABLET(100 MG) BY MOUTH TWICE DAILY   Last time this was given:  100 mg on 7/14/2018  8:08 AM                                      gabapentin 300 MG capsule   Commonly known as:  NEURONTIN   TAKE 2 CAPSULES(600 MG) BY MOUTH FOUR TIMES DAILY   Last time this was given:  600 mg on 7/14/2018  1:45 PM                                            glipiZIDE 10 MG 24 hr tablet   Commonly known as:  GLUCOTROL XL   TAKE 1 TABLET(10 MG) BY MOUTH TWICE DAILY   Last time this was given:  10 mg on 7/14/2018  8:09 AM                                      IBUPROFEN PO   Take 200 mg by mouth as needed for moderate pain                                   insulin detemir 100 UNIT/ML  injection   Commonly known as:  LEVEMIR FLEXTOUCH   INJECT 41 UNITS UNDER THE SKIN EVERY NIGHT AT BEDTIME.   Last time this was given:  7 Units on 7/14/2018  3:36 AM                                   insulin pen needle 32G X 4 MM   Commonly known as:  BD NILDA U/F   USE 1 TO 2 DAILY OR AS DIRECTED WITH LEVEMIR                                metFORMIN 1000 MG tablet   Commonly known as:  GLUCOPHAGE   TAKE 1 TABLET(1000 MG) BY MOUTH TWICE DAILY WITH MEALS                                      metoprolol tartrate 100 MG tablet   Commonly known as:  LOPRESSOR   TAKE 1 TABLET(100 MG) BY MOUTH TWICE DAILY   Last time this was given:  100 mg on 7/14/2018  8:09 AM                                      MULTIVITAMIN & MINERAL PO   Take 1 tablet by mouth every evening                                   OMEGA-3 FISH OIL PO   Take 1,200 mg by mouth every evening                                   omeprazole 20 MG CR capsule   Commonly known as:  priLOSEC   TAKE ONE CAPSULE BY MOUTH DAILY   Last time this was given:  20 mg on 7/14/2018  8:08 AM                                   QUEtiapine 50 MG tablet   Commonly known as:  SEROquel   TAKE 1 TABLET(50 MG) BY MOUTH AT BEDTIME   Last time this was given:  50 mg on 7/13/2018 10:00 PM                                   simvastatin 80 MG tablet   Commonly known as:  ZOCOR   Take 0.5 tablets (40 mg) by mouth At Bedtime   Last time this was given:  40 mg on 7/13/2018 10:00 PM                                   venlafaxine 150 MG 24 hr capsule   Commonly known as:  EFFEXOR-XR   TAKE 1 CAPSULE(150 MG) BY MOUTH DAILY WITH BREAKFAST   Last time this was given:  150 mg on 7/14/2018  8:09 AM

## 2018-07-13 NOTE — ED PROVIDER NOTES
History     Chief Complaint:  Fall and Hyperglycemia       HPI   Patricia Aden is a 72 year old female who presents alone to the Emergency Department for evaluation of a fall. The patient reports that she fell on the concrete in the side walk. She notes that she has been falling more frequently lately, and has pain on her right cheek, right shoulder and right lower lumbar. She also arrives for concern for hyperglycemia.  She is not on blood thinner or aspirin. She denies neck pain or loss of consciousness.      Allergies:  Penicillins     Medications:    ACETAMINOPHEN PO  buPROPion (WELLBUTRIN SR) 100 MG 12 hr tablet  gabapentin (NEURONTIN) 300 MG capsule  glipiZIDE (GLIPIZIDE XL) 10 MG 24 hr tablet  glipiZIDE (GLUCOTROL XL) 10 MG 24 hr tablet  insulin detemir (LEVEMIR FLEXTOUCH) 100 UNIT/ML injection  insulin pen needle (BD NILDA U/F) 32G X 4 MM  metFORMIN (GLUCOPHAGE) 1000 MG tablet  metoprolol tartrate (LOPRESSOR) 100 MG tablet  omeprazole (PRILOSEC) 20 MG CR capsule  QUEtiapine (SEROQUEL) 50 MG tablet  simvastatin (ZOCOR) 80 MG tablet  venlafaxine (EFFEXOR-XR) 150 MG 24 hr capsule    Past Medical History:    Anemia   Anxiety   ASCUS with positive high risk HPV  Depressive disorder   Diabetes mellitus (H)   Gastro-oesophageal reflux disease   High cholesterol   History of blood transfusion   Hypertension   Pain in right hip     Past Surgical History:    ARTHROPLASTY HIP BILATERAL    ARTHROPLASTY KNEE- left   ARTHROPLASTY KNEE   ARTHROPLASTY REVISION HIP    ARTHROPLASTY REVISION HIP   CHOLECYSTECTOMY    COLONOSCOPY    ORTHOPEDIC SURGERY       Family History:    DM   Cancer   Heart disease     Social History:  Marital Status:    The patient presents alone  Smoking Status: Former Smoker. 4 packs daily.   Smokeless Tobacco: Never  Alcohol Use: No     Review of Systems   Musculoskeletal: Positive for back pain. Negative for neck pain.        Right shoulder, right cheek pain    Neurological: Negative for  "syncope.   All other systems reviewed and are negative.      Physical Exam   First Vitals:  BP: 180/87  Heart Rate: 81  Temp: 98  F (36.7  C)  Resp: 18  Height: 165.1 cm (5' 5\")  Weight: 74.8 kg (165 lb)  SpO2: 97 %      Physical Exam  General: Resting on the gurney, appears comfortable  Head:  The scalp, face, and head appear normal. Slight tenderness to palpation over the right periorbital ridge.    Mouth/Throat: Mucus membranes are moist  CV:  Regular rate    Normal S1 and S2  No pathological murmur   Resp:  Breath sounds clear and equal bilaterally    Non-labored, no retractions or accessory muscle use    No coarseness    No wheezing   GI:  Abdomen is soft, no rigidity    No tenderness to palpation  MS:  Normal motor assessment of all extremities.    Good capillary refill noted.    Neck: No midline tenderness to palpation    Knee: Right knee abrasion. No bony tenderness to palpation.    Right hip: Posterior and lateral tenderness to palpation. Normal ROM.   Skin:  Right knee abrasion.   Neuro:   Speech is normal and fluent. No apparent deficit.  Psych: Awake. Alert.  Normal affect.      Appropriate interactions.    Emergency Department Course     ECG:  Indication: Fall and Hyperglycemia   Completed at 1800.  Read at 1800.   Normal sinus rhythm. T wave abnormality, consider anterior ischemia. Abnormal ECG.    Rate 78 bpm. LA interval 188. QRS duration 92. QT/QTc 376/428. P-R-T axes 43 -8 -2.     Imaging:  Radiology findings were communicated with the patient who voiced understanding of the findings.    CT Head w/o Contrast:   1. No evidence of acute intracranial hemorrhage, mass, or herniation.  2. There is generalized atrophy of the brain. White matter changes are  present in the cerebral hemispheres that are consistent with small  vessel ischemic disease in this age patient.  Report per radiology      XR Pelvis Hip 1 View:   No acute fracture or dislocation. Unremarkable appearance  of bilateral hip " arthroplasties.  Report per radiology      Laboratory:  Laboratory findings were communicated with the patient who voiced understanding of the findings.    BMP: Glucose 442 (H) o/w WNL (Creatinine 0.87)   CBC: AWNL. (WBC 7.3, HGB 12.6, )    Hemoglobin A1c: in process     Emergency Department Course:  The patient was sent for a XR, CT while in the emergency department, results above.   IV was inserted and blood was drawn for laboratory testing, results above.    Nursing notes and vitals reviewed.  1810: I performed an exam of the patient as documented above.     Findings and plan explained to the Patient who consents to admission. Discussed the patient with Dr. Talavera, who will admit the patient to a Observation bed for further monitoring, evaluation, and treatment.        Impression & Plan      Medical Decision Making:  Patricia Aden is a 72 year old female who presents for evaluation following a fall.  Workup for the etiology of her fall included CT head ans XR pelvis w/ Hip Right  Full examination revealed periorbital pain and swelling as well as hip pain.  X-Ray imaging unremarkable.    In terms of head injury, the patient had direct injury to the head and after discussion with the patient, advanced imaging was undertaken and negative.  The patient is also quite hyperglycemic.  The patient has not been taking her medications secondary to financial and social difficulties.  She was given insulin in the emergency department and given that she does not have a safe discharge plan will be admitted for hyperglycemia and falls.  Diagnosis:    ICD-10-CM    1. Hyperglycemia R73.9 Hemoglobin A1c     Hemoglobin A1c     CANCELED: Hemoglobin A1c       Disposition:  Admitted to Observation    Eva Marie  7/13/2018    EMERGENCY DEPARTMENT      Scribe Disclosure:  Eva CAMARENA, am serving as a scribe at 6:10 PM on 7/13/2018 to document services personally performed by Renata Arvizu MD based on my observations and  the provider's statements to me.         Renata Arvizu MD  07/15/18 0857

## 2018-07-13 NOTE — H&P
Admitted:     07/13/2018      PRIMARY CARE PROVIDER:  Iglesia Moreno MD      CHIEF COMPLAINT:  Fall.      HISTORY OF PRESENT ILLNESS:  Ms. Aden is a 72-year-old female with a history of insulin-requiring diabetes mellitus type 2, degenerative osteoarthritis, status post left total knee, right hip and left hip replacement, hypertension, hyperlipidemia, gastroesophageal reflux disease who presents to the emergency room after she sustained a fall today.  On questioning further, it appears like the patient has been having falls on a very consistent basis, almost every other day.  She mentions that whenever her feet get together, she loses her balance.  She tends to trip and falls down.  She also has neuropathy due to her diabetes mellitus and that does not help the situation, reportedly.  Today, she was walking to the bus stop from [employer] on her way to her apartment and she was carrying a heavy bag.  She lost her balance and fell forward.  She reportedly landed on the right side and scraped her face also in the process.  Denies any loss of consciousness.  No preceding chest pain, shortness of breath or palpitation.  No presyncope or syncope.  Denies any recent nausea or vomiting.  She does have a chronic intermittent diarrhea which has not changed.  Denies dysuria, urinary urgency or frequency.  No fever or chills.  No cough.      The patient reportedly has run out of her Levemir and lately has been very thirsty and drinking plenty of water.  She does mention that her blood sugars have been high, although unclear if she has been checking them consistently.      In the emergency room, the patient was evaluated by Dr. Arvizu.  Basic lab work showed normal electrolytes; however, her blood glucose was 442 with 40 ketones in the urine.  CT head was negative for acute process.  X-ray of the pelvis did not show any acute fractures.  Given the recurrent falls and uncontrolled diabetes mellitus, she is admitted for further  treatment and evaluation.      PAST MEDICAL HISTORY:   1.  Insulin-requiring diabetes mellitus type 2.   2.  Anxiety.   3.  Gastroesophageal reflux disease.   4.  Hyperlipidemia.   5.  Hypertension.   6.  Recurrent falls.      ALLERGIES:  FISH AND PENICILLIN.        SOCIAL HISTORY:  She lives alone.  Former smoker.  Denies any tobacco or alcohol use at the moment.  She does not drive anymore and she is actually considering moving to [place] with her son and daughter-in-law, as she thinks she might be evicted from her apartment.      FAMILY HISTORY:  Reviewed and is noncontributory.      HOME MEDICATIONS:     Prior to Admission Medications   Prescriptions Last Dose Informant Patient Reported? Taking?   ACETAMINOPHEN PO Past Month at Unknown time  Yes Yes   Sig: Take 500 mg by mouth as needed    IBUPROFEN PO Past Month at Unknown time  Yes Yes   Sig: Take 200 mg by mouth as needed for moderate pain   Multiple Vitamins-Minerals (MULTIVITAMIN & MINERAL PO) More than a month at Unknown time Self Yes No   Sig: Take 1 tablet by mouth every evening    Omega-3 Fatty Acids (OMEGA-3 FISH OIL PO) More than a month at Unknown time Self Yes No   Sig: Take 1,200 mg by mouth every evening    QUEtiapine (SEROQUEL) 50 MG tablet 7/12/2018 at pm  No Yes   Sig: TAKE 1 TABLET(50 MG) BY MOUTH AT BEDTIME   blood glucose (ONE TOUCH ULTRA) test strip  Self No No   Sig: Use to test blood sugar 3 times daily or as directed.   blood glucose monitoring (ONE TOUCH DELICA) lancets  Self No No   Sig: Use to test blood sugars 3 times daily or as directed.   buPROPion (WELLBUTRIN SR) 100 MG 12 hr tablet 7/13/2018 at am  No Yes   Sig: TAKE 1 TABLET(100 MG) BY MOUTH TWICE DAILY   gabapentin (NEURONTIN) 300 MG capsule 7/13/2018 at am  No Yes   Sig: TAKE 2 CAPSULES(600 MG) BY MOUTH FOUR TIMES DAILY   Patient taking differently: Take 600 mg by mouth 3 times daily TAKE 2 CAPSULES(600 MG) BY MOUTH FOUR TIMES DAILY   insulin detemir (LEVEMIR FLEXTOUCH) 100  UNIT/ML injection Past Week at Unknown time  No Yes   Sig: INJECT 41 UNITS UNDER THE SKIN EVERY NIGHT AT BEDTIME.   insulin pen needle (BD NILDA U/F) 32G X 4 MM   No No   Sig: USE 1 TO 2 DAILY OR AS DIRECTED WITH LEVEMIR   metFORMIN (GLUCOPHAGE) 1000 MG tablet 7/13/2018 at am  No Yes   Sig: TAKE 1 TABLET(1000 MG) BY MOUTH TWICE DAILY WITH MEALS   metoprolol tartrate (LOPRESSOR) 100 MG tablet Past Week at Unknown time  No Yes   Sig: TAKE 1 TABLET(100 MG) BY MOUTH TWICE DAILY   omeprazole (PRILOSEC) 20 MG CR capsule Past Week at Unknown time  No Yes   Sig: TAKE ONE CAPSULE BY MOUTH DAILY   venlafaxine (EFFEXOR-XR) 150 MG 24 hr capsule Past Week at Unknown time  No Yes   Sig: TAKE 1 CAPSULE(150 MG) BY MOUTH DAILY WITH BREAKFAST      Facility-Administered Medications: None      REVIEW OF SYSTEMS:  A complete review of system was done and was negative for anything else other than that mentioned in the HPI.      PHYSICAL EXAMINATION:     GENERAL:  Ms. Patricia Aden is a 72-year-old female.  She is not in any overt distress.   VITAL SIGNS:  Temperature 98, blood pressure 167/82, heart rate 84, respiration rate 16, O2 sats 98% on room air.   HEENT:  Positive for some scraping on the lateral aspect of her right eye.  Otherwise, head is atraumatic and normocephalic.  There is no pallor.   NECK:  Supple, with good range of motion.   RESPIRATORY:  Good air entry bilaterally with normal effort of breathing.   CARDIOVASCULAR:  Regular rate and rhythm.  There is no murmur.   ABDOMEN:  Soft, nontender, nondistended, bowel sounds normoactive.   EXTREMITIES:  No edema.  She does have a scrape over the right knee.  There is no effusion or point tenderness.   SKIN:  Warm and dry.   NEUROLOGIC:  She is awake, alert, oriented x 3, answering most of my questions appropriately and moving all 4 extremities.      LABORATORY AND DIAGNOSTIC DATA:  Blood glucose is 442 with 40 ketones in urine.  Urine has trace leukocyte esterase but just 4 WBC  and nitrite is negative.  X-ray of the pelvis does not show any acute fracture.  A 12-lead electrocardiogram shows normal sinus rhythm without any arrhythmias.      ASSESSMENT AND PLAN:  Ms. Patricia Aden is a 72-year-old female with multiple medical problems as described above, who was brought to the emergency room after she sustained another fall and was found to have uncontrolled diabetes.   1.  Recurrent falls.  The patient has had recurrent falls per her report and actually, looking back, it appears like this has been an ongoing problem since 2016.  She does appear to have poorly controlled diabetes.  Moreover, she also has diabetic neuropathy and has had knee and hip replacement which probably does not help.  She denies any presyncope or syncope or other red flag symptoms prior to the fall and moreover today, it appears purely mechanical and she was carrying a heavy bag after grocery shopping.  She does have some minor pain which we will try to treat with Tylenol.  We will get a physical therapy evaluation tomorrow.  It also could be that she was dehydrated due to uncontrolled diabetes as she had run out of her Levemir.  Hopefully, correction of her diabetes and correction of her dehydration will help.  We will also place a consult for .   2.  Uncontrolled diabetes mellitus type 2.  The patient's blood glucose today was 442.  Her last hemoglobin A1c in March was 7.9.  Reportedly, she has been out of her Levemir for the last 5 days and has been very thirsty and drinking a lot of water, most likely due to uncontrolled diabetes.  At this time I will resume her prior to admission Levemir.  She takes 41 units normally.  I will start her at 35 units and appropriately titrate up.  We will also place her on high intensity sliding scale.  We will hold her metformin for now.  Does appear like she takes Glucotrol-XL also; that will be resumed once verified by the pharmacy.   3.  Benign essential hypertension.   Her blood pressure is on the higher side in the emergency room today.  We will resume her prior to admission metoprolol 100 mg twice daily.  Will also have p.r.n. hydralazine.  She might need an ACE inhibitor or ARB  prior to discharge.   4.  History of anxiety and depression.  Continue prior to admission Wellbutrin, sustained release,  100 mg twice daily, and Seroquel 50 mg at bedtime.   5.  Hyperlipidemia.  Continue simvastatin 40 mg at bedtime.   6.  Diabetic neuropathy.  She is on Neurontin 600 mg 4 times daily; that will be continued.      Anticipated length of stay less than 2 midnights.      CODE STATUS:  Full code.         YULY OSWALD MD             D: 2018   T: 2018   MT: OCTAVIO      Name:     YOLY HENDRICKSON   MRN:      [id number]        Account:      [id number]   :      1945        Admitted:     2018                   Document: [id number]

## 2018-07-13 NOTE — PHARMACY-ADMISSION MEDICATION HISTORY
Admission medication history interview status for the 7/13/2018  admission is complete. See EPIC admission navigator for prior to admission medications     Medication history source reliability:Moderate    Actions taken by pharmacist (provider contacted, etc): Reviewed SureScripts     Additional medication history information not noted on PTA med list :  -She ran out of multiple medications in past week as noted & glipizide over a month.    Medication reconciliation/reorder completed by provider prior to medication history? No    Time spent in this activity: 15 min    Prior to Admission medications    Medication Sig Last Dose Taking? Auth Provider   ACETAMINOPHEN PO Take 500 mg by mouth as needed  Past Month at Unknown time Yes Unknown, Entered By History   buPROPion (WELLBUTRIN SR) 100 MG 12 hr tablet TAKE 1 TABLET(100 MG) BY MOUTH TWICE DAILY 7/13/2018 at am Yes Iglesia Moreno MD   gabapentin (NEURONTIN) 300 MG capsule TAKE 2 CAPSULES(600 MG) BY MOUTH FOUR TIMES DAILY  Patient taking differently: Take 600 mg by mouth 3 times daily TAKE 2 CAPSULES(600 MG) BY MOUTH FOUR TIMES DAILY 7/13/2018 at am Yes Iglesia Moreno MD   IBUPROFEN PO Take 200 mg by mouth as needed for moderate pain Past Month at Unknown time Yes Unknown, Entered By History   insulin detemir (LEVEMIR FLEXTOUCH) 100 UNIT/ML injection INJECT 41 UNITS UNDER THE SKIN EVERY NIGHT AT BEDTIME. Past Week at Unknown time Yes Iglesia Moreno MD   metFORMIN (GLUCOPHAGE) 1000 MG tablet TAKE 1 TABLET(1000 MG) BY MOUTH TWICE DAILY WITH MEALS 7/13/2018 at am Yes Iglesia Moreno MD   metoprolol tartrate (LOPRESSOR) 100 MG tablet TAKE 1 TABLET(100 MG) BY MOUTH TWICE DAILY Past Week at Unknown time Yes Iglesia Moreno MD   omeprazole (PRILOSEC) 20 MG CR capsule TAKE ONE CAPSULE BY MOUTH DAILY Past Week at Unknown time Yes Iglesia Moreno MD   QUEtiapine (SEROQUEL) 50 MG tablet TAKE 1 TABLET(50 MG) BY MOUTH AT BEDTIME 7/12/2018 at pm Yes Iglesia Moreno MD    simvastatin (ZOCOR) 80 MG tablet Take 0.5 tablets (40 mg) by mouth At Bedtime Past Week at Unknown time Yes Iglesia Moreno MD   venlafaxine (EFFEXOR-XR) 150 MG 24 hr capsule TAKE 1 CAPSULE(150 MG) BY MOUTH DAILY WITH BREAKFAST Past Week at Unknown time Yes Iglesia Moreno MD   blood glucose (ONE TOUCH ULTRA) test strip Use to test blood sugar 3 times daily or as directed.   Haase, Susan Rachele, APRN CNP   blood glucose monitoring (ONE TOUCH DELICA) lancets Use to test blood sugars 3 times daily or as directed.   Haase, Susan Rachele, APRN CNP   glipiZIDE (GLUCOTROL XL) 10 MG 24 hr tablet TAKE 1 TABLET(10 MG) BY MOUTH TWICE DAILY More than a month at Unknown time  Iglesia Moreno MD   insulin pen needle (BD NILDA U/F) 32G X 4 MM USE 1 TO 2 DAILY OR AS DIRECTED WITH LEVEMIR   Iglesia Moreno MD   Multiple Vitamins-Minerals (MULTIVITAMIN & MINERAL PO) Take 1 tablet by mouth every evening  More than a month at Unknown time  Reported, Patient   Omega-3 Fatty Acids (OMEGA-3 FISH OIL PO) Take 1,200 mg by mouth every evening  More than a month at Unknown time  Reported, Patient

## 2018-07-14 ENCOUNTER — APPOINTMENT (OUTPATIENT)
Dept: PHYSICAL THERAPY | Facility: CLINIC | Age: 73
End: 2018-07-14
Attending: INTERNAL MEDICINE
Payer: MEDICARE

## 2018-07-14 VITALS
DIASTOLIC BLOOD PRESSURE: 70 MMHG | SYSTOLIC BLOOD PRESSURE: 141 MMHG | OXYGEN SATURATION: 94 % | TEMPERATURE: 95.7 F | RESPIRATION RATE: 16 BRPM | HEIGHT: 65 IN | WEIGHT: 165.3 LBS | BODY MASS INDEX: 27.54 KG/M2

## 2018-07-14 LAB
ANION GAP SERPL CALCULATED.3IONS-SCNC: 6 MMOL/L (ref 3–14)
BUN SERPL-MCNC: 17 MG/DL (ref 7–30)
CALCIUM SERPL-MCNC: 9.8 MG/DL (ref 8.5–10.1)
CHLORIDE SERPL-SCNC: 107 MMOL/L (ref 94–109)
CO2 SERPL-SCNC: 27 MMOL/L (ref 20–32)
CREAT SERPL-MCNC: 0.71 MG/DL (ref 0.52–1.04)
GFR SERPL CREATININE-BSD FRML MDRD: 81 ML/MIN/1.7M2
GLUCOSE BLDC GLUCOMTR-MCNC: 311 MG/DL (ref 70–99)
GLUCOSE BLDC GLUCOMTR-MCNC: 319 MG/DL (ref 70–99)
GLUCOSE BLDC GLUCOMTR-MCNC: 369 MG/DL (ref 70–99)
GLUCOSE SERPL-MCNC: 314 MG/DL (ref 70–99)
POTASSIUM SERPL-SCNC: 3.9 MMOL/L (ref 3.4–5.3)
SODIUM SERPL-SCNC: 140 MMOL/L (ref 133–144)

## 2018-07-14 PROCEDURE — 40000193 ZZH STATISTIC PT WARD VISIT

## 2018-07-14 PROCEDURE — 25000132 ZZH RX MED GY IP 250 OP 250 PS 637: Mod: GY | Performed by: INTERNAL MEDICINE

## 2018-07-14 PROCEDURE — G0378 HOSPITAL OBSERVATION PER HR: HCPCS

## 2018-07-14 PROCEDURE — 25000131 ZZH RX MED GY IP 250 OP 636 PS 637: Mod: GY | Performed by: INTERNAL MEDICINE

## 2018-07-14 PROCEDURE — 99217 ZZC OBSERVATION CARE DISCHARGE: CPT | Performed by: PHYSICIAN ASSISTANT

## 2018-07-14 PROCEDURE — 00000146 ZZHCL STATISTIC GLUCOSE BY METER IP

## 2018-07-14 PROCEDURE — 97161 PT EVAL LOW COMPLEX 20 MIN: CPT | Mod: GP

## 2018-07-14 PROCEDURE — 96372 THER/PROPH/DIAG INJ SC/IM: CPT

## 2018-07-14 PROCEDURE — 25000131 ZZH RX MED GY IP 250 OP 636 PS 637: Mod: GY | Performed by: PHYSICIAN ASSISTANT

## 2018-07-14 PROCEDURE — 80048 BASIC METABOLIC PNL TOTAL CA: CPT | Performed by: INTERNAL MEDICINE

## 2018-07-14 PROCEDURE — 36415 COLL VENOUS BLD VENIPUNCTURE: CPT | Performed by: INTERNAL MEDICINE

## 2018-07-14 PROCEDURE — 25000131 ZZH RX MED GY IP 250 OP 636 PS 637: Mod: GY | Performed by: HOSPITALIST

## 2018-07-14 PROCEDURE — A9270 NON-COVERED ITEM OR SERVICE: HCPCS | Mod: GY | Performed by: INTERNAL MEDICINE

## 2018-07-14 PROCEDURE — 25000128 H RX IP 250 OP 636: Performed by: INTERNAL MEDICINE

## 2018-07-14 RX ADMIN — ACETAMINOPHEN 650 MG: 325 TABLET, FILM COATED ORAL at 00:00

## 2018-07-14 RX ADMIN — INSULIN DETEMIR 7 UNITS: 100 INJECTION, SOLUTION SUBCUTANEOUS at 03:36

## 2018-07-14 RX ADMIN — OMEPRAZOLE 20 MG: 20 CAPSULE, DELAYED RELEASE ORAL at 08:08

## 2018-07-14 RX ADMIN — VENLAFAXINE HYDROCHLORIDE 150 MG: 75 CAPSULE, EXTENDED RELEASE ORAL at 08:09

## 2018-07-14 RX ADMIN — BUPROPION HYDROCHLORIDE 100 MG: 100 TABLET, FILM COATED, EXTENDED RELEASE ORAL at 08:08

## 2018-07-14 RX ADMIN — GABAPENTIN 600 MG: 300 CAPSULE ORAL at 13:45

## 2018-07-14 RX ADMIN — SODIUM CHLORIDE: 9 INJECTION, SOLUTION INTRAVENOUS at 07:17

## 2018-07-14 RX ADMIN — METOPROLOL TARTRATE 100 MG: 50 TABLET ORAL at 08:09

## 2018-07-14 RX ADMIN — INSULIN ASPART 8 UNITS: 100 INJECTION, SOLUTION INTRAVENOUS; SUBCUTANEOUS at 12:59

## 2018-07-14 RX ADMIN — INSULIN ASPART 7 UNITS: 100 INJECTION, SOLUTION INTRAVENOUS; SUBCUTANEOUS at 08:05

## 2018-07-14 RX ADMIN — INSULIN ASPART 10 UNITS: 100 INJECTION, SOLUTION INTRAVENOUS; SUBCUTANEOUS at 14:20

## 2018-07-14 RX ADMIN — GLIPIZIDE 10 MG: 10 TABLET, FILM COATED, EXTENDED RELEASE ORAL at 08:09

## 2018-07-14 RX ADMIN — GABAPENTIN 600 MG: 300 CAPSULE ORAL at 08:09

## 2018-07-14 NOTE — PLAN OF CARE
Problem: Patient Care Overview  Goal: Plan of Care/Patient Progress Review  Outcome: Improving  diagnostic tests and consults completed and resulted - not met, SW/Pt consult pending  -vital signs normal or at patient baseline- Not met, Elevated BG   Nurse to notify provider when observation goals have been met and patient is ready for discharge.

## 2018-07-14 NOTE — CONSULTS
Care Transition Initial Assessment - SW   Following per PA request for assessment  Met with: patient  Active Problems:    Fall       DATA  Lives With: alone  Living Arrangements: apartment  Patient PCP is Dr Moreno and the  Clinic coordinator has been trying to reach her. Patient reports the CC has encouraged her to come in to see the PCP.  Identified issues/concerns regarding health management: patient lives alone and has been having frequent falls.  PT recommends TCU however patient is here under OBS status and does not have the means to pay privately for TCU.  Further patient does not identify that TCU is needed.  She has been in TCU following ortho surgery.  She also shares her apartment building has an exercise room and pool which she can use however admits she doesn't  She volunteers following information.  She has received eviction notice from her apartment because her July rent check was returned for insufficent funds. She states there will be an eviction hearing but she has not heard the court date.  Her plan is that once she needs to leave her apartment she is moving to [place] to live with her son and his fiance who have a two bedroom apartment.  She plans to take the train to [place].   She expressed feeling overwhelmed with the arrangements she needs to make such as finding homes for her two cats and dispensing her furniture.  She had called two agencies who do not take furniture, writer suggested she call [facility].    She normally takes the bus for errands and to visit her son who lives local in a group home.  She shares her son's fiance recently send her money to buy food.  Patient wears an emergency pendant, not [employer].  Complains it takes too long for the system to respond.  Writer has initially ask if she would accept home RN,PT and SW.  Patient was most interested in the SW visit, however because patient plans to take the bus to see her son tomorrow writer does not believe patient will meet the  home bound guidelines for home skilled services.  Transportation Available: public transportation    ASSESSMENT  Cognitive Status: oriented.    Concerns to be addressed:  Patient is at risk for ongoing falls and would benefit from home evaluation  however is not home bound.  Patient identifies concerns for her falls backward.  Explained provider will speak with her before d/c.     PLAN  Financial costs for the patient includes taxi ride home  Patient given options and choices for discharge yes  Patient/family is agreeable to the plan?  yes  Patient Goals and Preferences: d/c home  Patient anticipates discharging to:  Home.  Writer will contact [facility] to ask if they can make a home visit.  Their program doesn't require patient be home bound.

## 2018-07-14 NOTE — PLAN OF CARE
Problem: Patient Care Overview  Goal: Plan of Care/Patient Progress Review  Outcome: Adequate for Discharge Date Met: 07/14/18  A&O, VSS, denies pain, HA or dizziness. Up with walker and encouraged to use. Mod Cho diet. Coverage given per [employer]. Went over D/C paperwork, all questions answered and paperwork signed. Gave home meds back to Pt that she came in with. Pt left with all belongings.

## 2018-07-14 NOTE — DISCHARGE SUMMARY
[facility]  Discharge Summary        Patricia Aden MRN# [id number]   YOB: 1945 Age: 72 year old     Date of Admission:  7/13/2018  Date of Discharge:  7/14/2018  Admitting Physician:  Ilan Archer MD  Discharge Physician:  Merelne Mays PA-C  Discharging Service:  Hospitalist     Primary Provider: Iglesia Moreno [phone]     DISCHARGE DIAGNOSES/PROBLEM ORIENTED HOSPITAL COURSE:  Patricia Aden was admitted on 7/13/2018 by Ilan Archer MD and I would refer you to their history and physical.  The following problems were addressed during her hospitalization:    HPI:  Ms. Aden is a 72-year-old female with a history of insulin-requiring diabetes mellitus type 2, degenerative osteoarthritis, status post left total knee, right hip and left hip replacement, hypertension, hyperlipidemia, gastroesophageal reflux disease who presents to the emergency room after she sustained a fall today.  On questioning further, it appears like the patient has been having falls on a very consistent basis, almost every other day.  She mentions that whenever her feet get together, she loses her balance.  She tends to trip and falls down.  She also has neuropathy due to her diabetes mellitus and that does not help the situation, reportedly.  Today, she was walking to the bus stop from [facility] on her way to her apartment and she was carrying a heavy bag.  She lost her balance and fell forward.  She reportedly landed on the right side and scraped her face also in the process.  Denies any loss of consciousness.  No preceding chest pain, shortness of breath or palpitation.  No presyncope or syncope.  Denies any recent nausea or vomiting.  She does have a chronic intermittent diarrhea which has not changed.  Denies dysuria, urinary urgency or frequency.  No fever or chills.  No cough.       The patient reportedly has run out of her Levemir and lately has been very thirsty and drinking plenty of  water.  She does mention that her blood sugars have been high, although unclear if she has been checking them consistently.       In the emergency room, the patient was evaluated by Dr. Arvizu.  Basic lab work showed normal electrolytes; however, her blood glucose was 442 with 40 ketones in the urine.  CT head was negative for acute process.  X-ray of the pelvis did not show any acute fractures.  Given the recurrent falls and uncontrolled diabetes mellitus, she is admitted for further treatment and evaluation.       Uncontrolled DM-2  Medication Non-compliance  Financial Hardship: A1C 9.8 (up from 7.9 3/18). BG > 400 on arrival to the ER. Patient had noted she had been out of her Levemir  for 7 days due to being unable to afford her medications.  She actually just picked up her prescription when she fell as she received a loan from her son to afford medications.  Prior to admission regimen includes Levemir 41 units, metformin 1000 mg twice daily, and glipizide XL 10 mg daily.  --Started on 32 units of Levemir at bedtime as well as prior to admission glipizide and sliding scale insulin with persistent hyperglycemia.  Patient does note that she occasionally forgets to take her morning metformin and does not monitor blood sugars 4 times daily.  --We will plan to continue Levemir at 41 units nightly.  Patient to monitor 4 times daily.  Blood sugars consistently greater than 180 she can increase by 2 units  --Continue prior to admission metformin and glipizide  --Follow-up with PCP scheduled 7/19  --Encouraged patient to move to [place] as planned for more supportive social environment, see below  --Confrimed patient had 3 Insulin pens with her from [facility] at discharge.    Recurrent falls  Generalized weakness  Failure to thrive: Patient has had issues with recurrent falls over the past few months.  On this occasion she fell while attempting to catch the bus on her way home from [facility].  Head CT negative for acute  intracranial pathology  --Falls likely multifactorial in the setting of hyperglycemia, diabetic neuropathy, noncompliance with assistive devices as well as poor balance.  She has been told to use a walker, but typically does not in her apartment, nor when she is attempting to catch the bus  --Physical therapy evaluated the patient and recommended TCU, though patient unable to afford to pay privately  --Recommended home care, however patient unwilling to be homebound as she frequently visits her son who is in a group home. Could consider outpatient PT, but patient has issues with transportation  --Discussed patient with her son, Octavio, and daughter-in-law,Latosha, via phone with patient's permission.  Patient has had a general decline over the past several months.  She is occasionally missing her medications. She notes she is out of some medications, but when pharmacy contacted they report a recent 3 months refill.  She is being evicted from her apartment due to not paying her rent.  Family plans to move patient to [place] for added support.  I encouraged this decision to both patient and her family.  We discussed that she is currently in an undesirable and unsafe situation. She does not have the funds to pay for private therapy but is unable to receive any Medicare benefit with home care if she is unwilling to be homebound. She clearly needs some more social support, as she is missing medications and not paying her bills on time.   Ultimately patient will discharge home.  She has not had recurrent hospitalizations or ED visits.  She is alert and oriented and able to make her own decisions.  Per social work no indication to file a vulnerable adult at this time.  Her son and daughter-in-law are aware and in agreement with her discharging home.  They will continue to work on getting her moved to [place] for ongoing support.  They are hoping this move happened in the next 2 weeks  -- Follow up with PCP 7/19 as  scheduled      HTN:  -- Continue PTA Metoprolol 100 mg bid    Anxiety/Depression  -- Continue PTA Wellbutrin and Seroquel    DM Neuropathy  -- Continue PTA Gabapentin    Discharge Pain Plan:   - Patient currently has NO PAIN and is not being prescribed pain medications on discharge.      CODE STATUS:  Full Code    BRIEF HOSPITAL STAY SUMMARY (SENT HOME WITH PATIENT IN AVS):   Reason for your hospital stay       You were admitted for evaluation of after a fall.    You fall was likely due to high blood sugar and not using your walker.   Resume your home diabetic regimen. Be sure to take your medications   everyday. Use your walker at home as well.    Follow up with your regular MD as scheduled on 7/19. I would encourage you   to pursue moving to [place] to gain more support from your son.                      PENDING RESULTS:  Unresulted Labs Ordered in the Past 30 Days of this Admission     No orders found for last 61 day(s).          DISCHARGE INSTRUCTIONS AND FOLLOW-UP:  Follow-up Appointments     Follow-up and recommended labs and tests        1. Follow up with your regular MD as scheduled 7/19                    DISCHARGE DISPOSITION:  Discharged to home    DISCHARGE MEDICATIONS:  Current Discharge Medication List      CONTINUE these medications which have NOT CHANGED    Details   ACETAMINOPHEN PO Take 500 mg by mouth as needed       buPROPion (WELLBUTRIN SR) 100 MG 12 hr tablet TAKE 1 TABLET(100 MG) BY MOUTH TWICE DAILY  Qty: 180 tablet, Refills: 0    Associated Diagnoses: Major depressive disorder, recurrent episode, moderate (H)      gabapentin (NEURONTIN) 300 MG capsule TAKE 2 CAPSULES(600 MG) BY MOUTH FOUR TIMES DAILY  Qty: 240 capsule, Refills: 3    Associated Diagnoses: Other diabetic neurological complication associated with other specified diabetes mellitus (H)      IBUPROFEN PO Take 200 mg by mouth as needed for moderate pain      insulin detemir (LEVEMIR FLEXTOUCH) 100 UNIT/ML injection INJECT 41 UNITS  UNDER THE SKIN EVERY NIGHT AT BEDTIME.  Qty: 15 mL, Refills: 0    Associated Diagnoses: Type 2 diabetes mellitus with diabetic nephropathy, with long-term current use of insulin (H)      metFORMIN (GLUCOPHAGE) 1000 MG tablet TAKE 1 TABLET(1000 MG) BY MOUTH TWICE DAILY WITH MEALS  Qty: 180 tablet, Refills: 0    Associated Diagnoses: Type 2 diabetes mellitus with diabetic nephropathy, with long-term current use of insulin (H)      metoprolol tartrate (LOPRESSOR) 100 MG tablet TAKE 1 TABLET(100 MG) BY MOUTH TWICE DAILY  Qty: 180 tablet, Refills: 0    Associated Diagnoses: Essential hypertension      omeprazole (PRILOSEC) 20 MG CR capsule TAKE ONE CAPSULE BY MOUTH DAILY  Qty: 90 capsule, Refills: 3    Associated Diagnoses: Gastroesophageal reflux disease with esophagitis      QUEtiapine (SEROQUEL) 50 MG tablet TAKE 1 TABLET(50 MG) BY MOUTH AT BEDTIME  Qty: 90 tablet, Refills: 0    Associated Diagnoses: Major depressive disorder, recurrent episode, moderate (H)      simvastatin (ZOCOR) 80 MG tablet Take 0.5 tablets (40 mg) by mouth At Bedtime  Qty: 45 tablet, Refills: 1    Associated Diagnoses: Hyperlipidemia LDL goal <100      venlafaxine (EFFEXOR-XR) 150 MG 24 hr capsule TAKE 1 CAPSULE(150 MG) BY MOUTH DAILY WITH BREAKFAST  Qty: 90 capsule, Refills: 3    Associated Diagnoses: Major depressive disorder, recurrent episode, moderate (H)      blood glucose (ONE TOUCH ULTRA) test strip Use to test blood sugar 3 times daily or as directed.  Qty: 100 strip, Refills: 3    Associated Diagnoses: Type 2 diabetes, HbA1C goal < 8% (H)      blood glucose monitoring (ONE TOUCH DELICA) lancets Use to test blood sugars 3 times daily or as directed.  Qty: 1 Box, Refills: prn    Associated Diagnoses: Type 2 diabetes, HbA1C goal < 8% (H)      glipiZIDE (GLUCOTROL XL) 10 MG 24 hr tablet TAKE 1 TABLET(10 MG) BY MOUTH TWICE DAILY  Qty: 180 tablet, Refills: 1    Associated Diagnoses: Type 2 diabetes mellitus with diabetic nephropathy, with  "long-term current use of insulin (H)      insulin pen needle (BD NILDA U/F) 32G X 4 MM USE 1 TO 2 DAILY OR AS DIRECTED WITH LEVEMIR  Qty: 100 each, Refills: 1    Associated Diagnoses: Type 2 diabetes mellitus with diabetic neuropathy (H)      Multiple Vitamins-Minerals (MULTIVITAMIN & MINERAL PO) Take 1 tablet by mouth every evening       Omega-3 Fatty Acids (OMEGA-3 FISH OIL PO) Take 1,200 mg by mouth every evening                CONSULTATIONS THIS HOSPITAL STAY:  None    CONDITION AND PHYSICAL EXAM ON DISCHARGE:  Discharge Condition: Stable    /70 (BP Location: Right arm)  Temp 95.7  F (35.4  C) (Oral)  Resp 16  Ht 1.651 m (5' 5\")  Wt 75 kg (165 lb 4.8 oz)  SpO2 94%  BMI 27.51 kg/m2  Gen: sitting in bed, alert, sitting in chair, eating breakfast  HEENT: normocephalic; small abrasion to right forehead, oropharynx clear  Card: RRR, S1, S2, no murmurs  Resp: lungs clear to auscultation bilaterally  GI: abdomen soft, not-tender, non-distended, +BS  Ext: no LE edema  Neuro: CX II-XII grossly in tact; ROM in all four extremities grossly in tact  Psych: alert and oriented x3; normal affect    DISCHARGE ORDERS FOR FACILITY:  After Care Instructions     Activity       Your activity upon discharge: activity as tolerated            Diet       Follow this diet upon discharge: Orders Placed This Encounter      Moderate Consistent CHO Diet            Monitor and record       blood glucose 4 times a day, before meals and at bedtime                          DISCHARGE TIME:  Greater than 30 minutes.    IMAGING RESULTS FROM THIS HOSPITAL STAY:  Results for orders placed or performed during the hospital encounter of 07/13/18   CT Head w/o Contrast    Narrative    CT SCAN OF THE HEAD WITHOUT CONTRAST   7/13/2018 5:08 PM     HISTORY: Fall.     TECHNIQUE:  Axial images of the head and coronal reformations without  IV contrast material. Radiation dose for this scan was reduced using  automated exposure control, adjustment of " the mA and/or kV according  to patient size, or iterative reconstruction technique.    COMPARISON: Head CT 10/10/2016.    FINDINGS: There is no evidence of intracranial hemorrhage, mass, acute  infarct or anomaly. There is generalized atrophy of the brain. There  is low attenuation in the white matter of the cerebral hemispheres  consistent with sequelae of small vessel ischemic disease. Ventricular  size is within normal limits without evidence of hydrocephalus.     The visualized portions of the sinuses and mastoids appear normal. The  bony calvarium and bones of the skull base appear intact.       Impression    IMPRESSION:     1. No evidence of acute intracranial hemorrhage, mass, or herniation.  2. There is generalized atrophy of the brain. White matter changes are  present in the cerebral hemispheres that are consistent with small  vessel ischemic disease in this age patient.      HARMEET MINOR MD   XR Pelvis w Hip Right 1 View    Narrative    XR PELVIS AND RIGHT HIP ONE VIEW   7/13/2018 4:47 PM     HISTORY: Fall.    COMPARISON: None.       Impression    IMPRESSION: No acute fracture or dislocation. Unremarkable appearance  of bilateral hip arthroplasties.    VANESSA ROB MD       MOST RECENT LAB RESULTS:  Most Recent 3 CBC's:  Recent Labs   Lab Test  07/13/18   1553  03/13/18   0946  01/30/17   1332   WBC  7.3  7.7  7.3   HGB  12.6  13.2  12.5   MCV  87  93  92   PLT  166  249  315      Most Recent 3 BMP's:  Recent Labs   Lab Test  07/14/18   0553  07/13/18   1553  03/13/18   0946   NA  140  137  143   POTASSIUM  3.9  4.0  4.4   CHLORIDE  107  103  108   CO2  27  26  29   BUN  17  27  12   CR  0.71  0.87  0.68   ANIONGAP  6  8  6   MARY  9.8  9.7  10.3*   GLC  314*  442*  105*     Most Recent 3 Troponin's:No lab results found.    Invalid input(s): TROP, TROPONINIES  Most Recent 3 INR's:No lab results found.  Most Recent 2 LFT's:  Recent Labs   Lab Test  03/13/18   0946  01/30/17   1332   AST  52*  64*   ALT   52*  89*   ALKPHOS  101  111   BILITOTAL  0.6  0.4     Most Recent Cholesterol Panel:  Recent Labs   Lab Test  03/13/18   0946   CHOL  135   LDL  62   HDL  38*   TRIG  177*     Most Recent 6 Bacteria Isolates From Any Culture (See EPIC Reports for Culture Details):  Recent Labs   Lab Test  09/18/15   0958  07/09/14   1336  10/24/12   1010  11/07/11   0937   CULT  >100,000 colonies/mL Escherichia coli*  No anaerobes isolated  On day 2, isolated in broth only: Coagulase negative Staphylococcus  These bacteria are part of normal skin pam, but on occasion, may be true   pathogens.  Clinical correlation must be applied to interpreting this   microbiology result.  *  No Beta Streptococcus isolated  >100,000 colonies/mL Escherichia coli     Most Recent TSH, T4 and HgbA1c:   Recent Labs   Lab Test  07/13/18   1553  03/13/18   0946   TSH   --   9.73*   T4   --   0.91   A1C  9.8*  7.9*

## 2018-07-14 NOTE — PLAN OF CARE
Problem: Patient Care Overview  Goal: Plan of Care/Patient Progress Review  Physical Therapy: Order received, chart reviewed and evaluation completed. Pt is a 72 year old female under OBS status s/p fall with minor right sided abrasions. Patient reports that she lives alone in an apartment. Pt reports that she has access to a FWW at home but does not use. Pt reports that she falls 1-2 times/month. When she falls, she reports that she is able to crawl to furniture and get up independently. Pt reports that she has no family or friends available to assist at discharge.    Discharge Planner PT   Patient plan for discharge: Home   Current status: Pt independent with bed mobility, good sitting balance at EOB. Pt able to perform sit to/from stand and stand pivot transfer with FWW with SBA. Pt ambulates 200' with SBA, requiring up to CGA for environment barrier management.   Barriers to return to prior living situation: Lives alone, multiple falls, level of assist, fall risk  Recommendations for discharge: TCU  Rationale for recommendations: Pt will benefit from continued therapy after discharge to address impairments and increase mobility and functional independence prior to returning home. Pt does not have assist at home and is at risk for continued falls based on today's mobility.       Entered by: Sonia Gonzalez 07/14/2018 10:20 AM      Will plan to discharge patient from PT and complete PT order as patient is under OBS status and discharge recommendations are listed above.

## 2018-07-14 NOTE — PLAN OF CARE
Problem: Patient Care Overview  Goal: Plan of Care/Patient Progress Review  Outcome: Improving  diagnostic tests and consults completed and resulted - not met, SW/Pt consult pending  -vital signs normal or at patient baseline- Not met, Elevated BG   Nurse to notify provider when observation goals have been met and patient is ready for discharge.    Patient a&O. VSS,RA. C/O headache, resolved with PRN Tylenol x1. Tele SR. Denied SOB/lightheadedness/Dizziness. BG at 2 am 369. updated MD, order received for 7 units of Levimir. Administered the dose. Plan for PT and SW consult. IV infusing. Mod carb diet. Assist x1/walker/unsteady to the bathroom. Voiding. Worried about two cats at home alone by themselves. Continue to monitor.

## 2018-07-14 NOTE — PLAN OF CARE
Problem: Patient Care Overview  Goal: Plan of Care/Patient Progress Review  Outcome: Improving  Observation Goals  -diagnostic tests and consults completed and resulted - in progress  -vital signs normal or at patient baseline - partially met  -Controlled Glucose Levels - Not Met    Nurse to notify provider when observation goals have been met and patient is ready for discharge.    Pt has been coping well this shift.  Pt is alert and Oriented x4 and is able to make needs known.  Pt has denied having pain when asked.  Pt has been moving with an assist of 1 and is unsteady.  Pt reports that she has a walker at home but does not use it.  Pt has an abrasion on her right knee and some slight bruising by her right eye from her fall. Pt has been having frequent falls lately.  Bed alarm is on and call light is within reach. Pt vital signs have been stable and O2 sats have been maintained on RA.  pt blood glucose has remained elevated this shift as well.  Pt was given both Levemir and SS at bedtime.  Pt has reported having some home issues with money and difficulty getting medications filled.  Pt was leaving [facility] when she fell and has just picked up her levemir insulin pens.  The pens have been labeled and placed in a plastic bag in the [facility] room fridge.  The pt has also brought food that was supposed to be kept cold. Food was placed in ziploc bags with labels and was placed in the freezer.  Pt will need these items returned on admission.  Will continue to monitor.

## 2018-07-14 NOTE — PROVIDER NOTIFICATION
Paged on call hospitalist to update on patient BG. BG at [phone], @ [phone]- [phone]. Patient got ^ unit of Novolog and 25 units of Levimir at HS. Awaiting call back.

## 2018-07-14 NOTE — PROGRESS NOTES
07/14/18 0900   Quick Adds   Type of Visit Initial PT Evaluation   Living Environment   Lives With alone   Living Arrangements apartment   Home Accessibility no concerns   Number of Stairs to Enter Home 0   Number of Stairs Within Home 0   Transportation Available public transportation   Self-Care   Dominant Hand right   Usual Activity Tolerance good   Current Activity Tolerance moderate   Regular Exercise no   Equipment Currently Used at Home none   Functional Level Prior   Ambulation 0-->independent   Transferring 0-->independent   Fall history within last six months yes   Number of times patient has fallen within last six months 10   Which of the above functional risks had a recent onset or change? fall history   Prior Functional Level Comment Pt reports that she has a FWW at home but does not use it. Reports greater than 1 fall per month   General Information   Onset of Illness/Injury or Date of Surgery - Date 07/13/18   Referring Physician Dr. Dorsey   Patient/Family Goals Statement To walk   Pertinent History of Current Problem (include personal factors and/or comorbidities that impact the POC) Pt is a 72 year old female under OBS status s/p fall with right sided abrasions.   Precautions/Limitations fall precautions   Pain Assessment   Patient Currently in Pain No   Range of Motion (ROM)   ROM Quick Adds No deficits were identified   Strength   Strength Comments Bilateral LE strength grossly 4+/5   Bed Mobility   Bed Mobility Comments Supine to/from sit independently   Transfer Skills   Transfer Comments Sit to/from stand SBA with cues for hand placement   Gait   Gait Comments 200' FWW SBA with intermittent CGA secondary to safety concerns with environmental barrier management   Balance   Balance Comments Good in sitting, fair in standing   Clinical Impression   Criteria for Skilled Therapeutic Intervention evaluation only   Clinical Presentation Stable/Uncomplicated   Clinical Presentation Rationale VSS,  "pain controlled   Clinical Decision Making (Complexity) Low complexity   Anticipated Equipment Needs at Discharge walker   Anticipated Discharge Disposition Transitional Care Facility   Risk & Benefits of therapy have been explained Yes   Patient, Family & other staff in agreement with plan of care Yes   [facility] TM \"6 Clicks\"   2016, Trustees of [facility], under license to [employer].  All rights reserved.   6 Clicks Short Forms Basic Mobility Inpatient Short Form   [facility] AM-PAC  \"6 Clicks\" V.2 Basic Mobility Inpatient Short Form   1. Turning from your back to your side while in a flat bed without using bedrails? 4 - None   2. Moving from lying on your back to sitting on the side of a flat bed without using bedrails? 4 - None   3. Moving to and from a bed to a chair (including a wheelchair)? 3 - A Little   4. Standing up from a chair using your arms (e.g., wheelchair, or bedside chair)? 3 - A Little   5. To walk in hospital room? 3 - A Little   6. Climbing 3-5 steps with a railing? 3 - A Little   Basic Mobility Raw Score (Score out of 24.Lower scores equate to lower levels of function) 20   Total Evaluation Time   Total Evaluation Time (Minutes) 25     "

## 2018-07-16 ENCOUNTER — PATIENT OUTREACH (OUTPATIENT)
Dept: CARE COORDINATION | Facility: CLINIC | Age: 73
End: 2018-07-16

## 2018-07-16 ENCOUNTER — TELEPHONE (OUTPATIENT)
Dept: FAMILY MEDICINE | Facility: CLINIC | Age: 73
End: 2018-07-16

## 2018-07-16 NOTE — PROGRESS NOTES
Clinic Care Coordination Contact    Clinic Care Coordination Contact  OUTREACH    Referral Information: Hospital Follow Up       Chief Complaint   Patient presents with     Clinic Care Coordination - [facility]     Discharged to home.         Universal Utilization:      Utilization    Last refreshed: 2018 10:29 AM:  No Show Count (past year) 3       Last refreshed: 2018 10:29 AM:  ED visits 0       Last refreshed: 2018 10:29 AM:  Hospital admissions 1          Current as of: 2018 10:29 AM             Clinical Concerns:  Current Medical Concerns:  Patient with complicated diabetes management with an observation stay at [facility] due to a fall and hyperglycemia. Per the patient she fell and hit her head after carrying too many groceries and not using her walker (her gait is unstable). Patient was taken to the ED for follow up. While in the ED her BS was 442. Patient had not taken her insulin for days due to the inability to afford her medication. Writer had previously called the patient multiple times to get her into the clinic for follow up.   Writer spoke with the patient who stated she is feeling better. Per the patient she is using her walker more and was able to get 2 of her prescriptions. Patient has been taking her levemir but stated her glipizide was in need of prior authorization. RN CC called the pharmacy and the patient did not  the medication and he was unsure why.   Spoke with the patient who stated she is doing much better. Patient did state that she is not checking her blood sugars due to her strips being . Patient has a follow up appointment with her PCP on Thursday. RN CC did discuss how a taxi voucher would be used for this appointment. Patient denied any immediate medical concerns.     Current Behavioral Concerns: Family has voiced some concerns about some cognitive decline. At this time the patient was alert and orientated.     Education Provided to  patient: Care Coordination. Importance of PCP follow up. Medication instruction.      Medication Management:  Reviewed. Patient currently not taking her glipizide or simvastatin. PAtient did not pick these up from the pharmacy. Per the patient glipizide needed a prior auth but the pharmacist stated that was not true. Unsure if the patient did not have sufficient funds. Family aware. Unsure if the patient is complying with her regimen. Patient coming into the clinic on Thursday for follow up.     Functional Status:   Patient has unsteady gait at times. Patient encouraged to use her walker for safety and balance. Unsure the compliance with her walker.     Living Situation:   Patient is being evicted from her apartment. Per the hospital notes, patient and family the plan is to move the patient to [place] to stay with family. Patient during our conversation was on board with this during the first half of our conversation and then during the last half she was talking about how the [facility] is going to see if they can help her stay in her apartment. Family was not aware of this. Will continue to monitor the situation. GIACOMO CC available for support if needed.        Transportation:   Patient unable to drive at this time. Patient relies on the bus but does have financial concerns so that limits her bus riding abilities. RN CC will be using a taxi voucher for her appointment on Thursday.         Psychosocial:   Patient has a son that lives in a group home. Patient very hesitant to move to [place] due to this son. Patient visits him daily. Family in [place] supportive and aware of the current situation. Family in [place] on board for moving the patient.   RN CC received permission to call the son and daughter in law in [place]. Discussed with the daughter in law that it may become a vulnerable adult situation if this continues to happen. Daughter in law on board and aware.      Financial/Insurance: [employer].   Patient has  financial concerns at this time. PAtient being evicted from her apartment. Patient was unable to afford medications. Patient unable to afford transportation at times.   RN CC working with family on moving the patient to [place] to help with these concerns.         Goals:   Goals        General    1. Medication 1 (pt-stated)     Notes - Note created  7/11/2018  8:34 AM by Karen Lyons RN    Goal Statement: I will work with Care Coordination to help me with my medication management   Measure of Success: Patient will obtain all of her prescriptions   Supportive Steps to Achieve: RN CC will speak with PCP. Touch base with Diabetes Education. Include social work   Barriers: Unsure of the patient's motivation   Strengths: Family supportive.   Date to Achieve By: Ongoing.         2. Medical (pt-stated)     Notes - Note created  7/11/2018  8:37 AM by Karen Lyons RN    Goal Statement: I will come into the clinic for an appointment   Measure of Success: Patient will set up appointment and attend.   Supportive Steps to Achieve: RN CC will help facilitate appointment  Barriers: No transportation. Unsure of her motivation level.   Strengths: Family calling family who lives in [place] to help with transportation.   Date to Achieve By: Sept 1, 2018              Patient/Caregiver understanding: Patient verbalized understanding, engaged in AIDET communication behavior during encounter.      Outreach Frequency: monthly  Future Appointments              In 3 days Iglesia Moreno MD [facility]          Plan:   RN CC will set up taxi ride for follow up appointment thru clinic. RN CC will touch base with the patient on Wednesday to remind her of her appointment. RN CC will reach out after appointment to follow up on the moving plan.     Renata Lyons RN  Clinic Care Coordinator  [phone]  [email or web address]

## 2018-07-16 NOTE — TELEPHONE ENCOUNTER
I approve of requested home care orders.    Iglesia Moreno  Please make sure pt is sched to see me

## 2018-07-18 NOTE — PROGRESS NOTES
Clinic Care Coordination Contact    Clinic Care Coordination Contact  OUTREACH    Chief Complaint   Patient presents with     Clinic Care Coordination - [facility]     Discharged to home.         Clinical Concerns:  Current Medical Concerns: Patient called RN CC to discuss some concerns. Patient was recently seen in the hospital after a fall. Patients blood sugar at the time was elevated. Patient due to follow up with PCP on 7/19. Patient did voice that she has fallen since being home from the hospital Patient encouraged to use her walker. Patient understood.   As for her diabetes the patient stated she continues to have increased thirst. Patient did voice some dizziness but this did not seem like it was a new symptom. Seems to be ongoing. Patient coming into the clinic for follow up. Will address her medications at that time.       Education Provided to patient: Health Care Directive, Safety concerns, Diabetes management, Medication education.          Medication Management:  Per the patient she has been taking her medications regularly. Patient stated she is going to  her glipizide. Patient will bring all of her medications into the clinic for her follow up.     Functional Status:   Patient does not use her walker appropriately. Educated provided on safety and walker use.     Living Situation:   Patient being evicted from her apartment. Patient will be moving to [place] to live with family for support.     Transportation:   Limited funds for the bus. Patient will be using a taxi voucher from the clinic for transportation. Patient understands.      Financial/Insurance: Financial concerns with the patient. Patient being evicted from her home, unable to afford medications, and limited funds for food and transportation. Family has helped by sending money.   Will continue to talk with patient about this.       Goals:   Goals        General    1. Medication 1 (pt-stated)     Notes - Note created  7/11/2018  8:34  AM by Karen Lyons RN    Goal Statement: I will work with Care Coordination to help me with my medication management   Measure of Success: Patient will obtain all of her prescriptions   Supportive Steps to Achieve: RN CC will speak with PCP. Touch base with Diabetes Education. Include social work   Barriers: Unsure of the patient's motivation   Strengths: Family supportive.   Date to Achieve By: Ongoing.         2. Medical (pt-stated)     Notes - Note created  7/11/2018  8:37 AM by Karen Lyons RN    Goal Statement: I will come into the clinic for an appointment   Measure of Success: Patient will set up appointment and attend.   Supportive Steps to Achieve: RN CC will help facilitate appointment  Barriers: No transportation. Unsure of her motivation level.   Strengths: Family calling family who lives in [place] to help with transportation.   Date to Achieve By: Sept 1, 2018              Patient/Caregiver understanding: Patient verbalized understanding, engaged in AIDET communication behavior during encounter.      Outreach Frequency: monthly  Future Appointments              Tomorrow Iglesia Moreno MD [facility]          Plan:   1. RN CC will get a taxi for the patient for her follow up appointment.   2. RN CC will meet with the patient in clinic.   3. RN CC will support the patient during this time.     Renata Lyons RN  Clinic Care Coordinator  [phone]  [email or web address]

## 2018-07-19 ENCOUNTER — OFFICE VISIT (OUTPATIENT)
Dept: FAMILY MEDICINE | Facility: CLINIC | Age: 73
End: 2018-07-19
Payer: COMMERCIAL

## 2018-07-19 VITALS
TEMPERATURE: 97 F | SYSTOLIC BLOOD PRESSURE: 134 MMHG | DIASTOLIC BLOOD PRESSURE: 68 MMHG | OXYGEN SATURATION: 97 % | HEART RATE: 72 BPM

## 2018-07-19 DIAGNOSIS — F41.9 ANXIETY: ICD-10-CM

## 2018-07-19 DIAGNOSIS — M81.0 AGE-RELATED OSTEOPOROSIS WITHOUT CURRENT PATHOLOGICAL FRACTURE: ICD-10-CM

## 2018-07-19 DIAGNOSIS — E78.5 HYPERLIPIDEMIA LDL GOAL <100: ICD-10-CM
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
## 2018-07-19 DIAGNOSIS — Z79.4 TYPE 2 DIABETES MELLITUS WITH DIABETIC NEPHROPATHY, WITH LONG-TERM CURRENT USE OF INSULIN (H): ICD-10-CM

## 2018-07-19 DIAGNOSIS — E11.21 TYPE 2 DIABETES MELLITUS WITH DIABETIC NEPHROPATHY, WITH LONG-TERM CURRENT USE OF INSULIN (H): ICD-10-CM

## 2018-07-19 PROCEDURE — 99495 TRANSJ CARE MGMT MOD F2F 14D: CPT | Performed by: INTERNAL MEDICINE

## 2018-07-19 RX ORDER — BLOOD-GLUCOSE METER
EACH MISCELLANEOUS
Qty: 400 EACH | Refills: 3 | Status: SHIPPED | OUTPATIENT
Start: 2018-07-19

## 2018-07-19 RX ORDER — GLIPIZIDE 10 MG/1
TABLET, FILM COATED, EXTENDED RELEASE ORAL
Qty: 180 TABLET | Refills: 1 | Status: SHIPPED | OUTPATIENT
Start: 2018-07-19

## 2018-07-19 RX ORDER — SIMVASTATIN 80 MG
40 TABLET ORAL AT BEDTIME
Qty: 45 TABLET | Refills: 1 | Status: SHIPPED | OUTPATIENT
Start: 2018-07-19 | End: 2019-01-22

## 2018-07-19 NOTE — PROGRESS NOTES
Clinic Care Coordination Contact      Patient called to be updated that RN Care Coordinator has made her a cab reservation at 3pm for her appointment in the clinic today. Will meet with the patient in clinic.     Renata Barger RN  Clinic Care Coordinator  [phone]  [email or web address]

## 2018-07-19 NOTE — MR AVS SNAPSHOT
After Visit Summary   7/19/2018    Patricia Aden    MRN: [id number]           Patient Information     Date Of Birth          1945        Visit Information        Provider Department      7/19/2018 4:00 PM Iglesia Moreno MD [facility]        Today's Diagnoses     Fall, initial encounter    -  1    Type 2 diabetes mellitus with diabetic nephropathy, with long-term current use of insulin (H)        Anxiety        Age-related osteoporosis without current pathological fracture        Hyperlipidemia LDL goal <100           Follow-ups after your visit        Your next 10 appointments already scheduled     Jul 26, 2018 10:00 AM CDT   [facility]    [address] [address] [phone]           Bring a current list of meds and any records pertaining to this visit. For Physicals, please bring immunization records and any forms needing to be filled out. Please arrive 10 minutes early to complete paperwork.              Who to contact     If you have questions or need follow up information about today's clinic visit or your schedule please contact [facility] directly at [phone].  Normal or non-critical lab and imaging results will be communicated to you by MyChart, letter or phone within 4 business days after the clinic has received the results. If you do not hear from us within 7 days, please contact the clinic through [employer] or phone. If you have a critical or abnormal lab result, we will notify you by phone as soon as possible.  Submit refill requests through [facility] or call your pharmacy and they will forward the refill request to us. Please allow 3 business days for your refill to be completed.          Additional Information About Your Visit        MyChart Information     [facility] gives you secure access to your electronic health record. If you see a primary care provider, you  can also send messages to your care team and make appointments. If you have questions, please call your primary care clinic.  If you do not have a primary care provider, please call [phone] and they will assist you.        Care EveryWhere ID     This is your Care EveryWhere ID. This could be used by other organizations to access your [facility] medical records  [id number]        Your Vitals Were     Pulse Temperature Pulse Oximetry Breastfeeding?          72 97  F (36.1  C) (Oral) 97% No         Blood Pressure from Last 3 Encounters:   07/19/18 134/68   07/14/18 141/70   03/13/18 136/73    Weight from Last 3 Encounters:   07/14/18 165 lb 4.8 oz (75 kg)   03/13/18 171 lb (77.6 kg)   11/29/17 176 lb (79.8 kg)              Today, you had the following     No orders found for display         Today's Medication Changes          These changes are accurate as of 7/19/18 11:59 PM.  If you have any questions, ask your nurse or doctor.               These medicines have changed or have updated prescriptions.        Dose/Directions    * blood glucose monitoring lancets   This may have changed:  Another medication with the same name was added. Make sure you understand how and when to take each.   Used for:  Type 2 diabetes, HbA1C goal < 8% (H)   Changed by:  Iglesia Moreno MD        Use to test blood sugars 3 times daily or as directed.   Quantity:  1 Box   Refills:  prn       * LIFESCAN FINEPOINT LANCETS Misc   This may have changed:  You were already taking a medication with the same name, and this prescription was added. Make sure you understand how and when to take each.   Used for:  Type 2 diabetes mellitus with diabetic nephropathy, with long-term current use of insulin (H)   Changed by:  Iglesia Moreno MD        Use to test blood sugars 4 times daily or as directed.   Quantity:  400 each   Refills:  3       * blood glucose monitoring test strip   Commonly known as:  ONETOUCH ULTRA   This may have changed:   Another medication with the same name was added. Make sure you understand how and when to take each.   Used for:  Type 2 diabetes, HbA1C goal < 8% (H)   Changed by:  Iglesia Moreno MD        Use to test blood sugar 3 times daily or as directed.   Quantity:  100 strip   Refills:  3       * blood glucose monitoring test strip   Commonly known as:  no brand specified   This may have changed:  You were already taking a medication with the same name, and this prescription was added. Make sure you understand how and when to take each.   Used for:  Type 2 diabetes mellitus with diabetic nephropathy, with long-term current use of insulin (H)   Changed by:  Iglesia Moreno MD        Use to test blood sugars 4 times daily or as directed   Quantity:  400 strip   Refills:  3       gabapentin 300 MG capsule   Commonly known as:  NEURONTIN   This may have changed:    - how much to take  - how to take this  - when to take this  - additional instructions   Used for:  Other diabetic neurological complication associated with other specified diabetes mellitus (H)        TAKE 2 CAPSULES(600 MG) BY MOUTH FOUR TIMES DAILY   Quantity:  240 capsule   Refills:  3       * Notice:  This list has 4 medication(s) that are the same as other medications prescribed for you. Read the directions carefully, and ask your doctor or other care provider to review them with you.         Where to get your medicines      These medications were sent to [place], [place] - [address], [address]  [address], [facility] [address], [facility] [address]     Phone:  [phone]     blood glucose monitoring test strip    glipiZIDE 10 MG 24 hr tablet    LIFESCAN FINEPOINT LANCETS Misc    simvastatin 80 MG tablet                Primary Care Provider Office Phone # Fax #    Iglesia Moreno [phone] [address]  [facility] [address]        Goals        General    1. Medication 1 (pt-stated)     Notes  - Note created  7/11/2018  8:34 AM by Karen Lyons RN    Goal Statement: I will work with Care Coordination to help me with my medication management   Measure of Success: Patient will obtain all of her prescriptions   Supportive Steps to Achieve: RN CC will speak with PCP. Touch base with Diabetes Education. Include social work   Barriers: Unsure of the patient's motivation   Strengths: Family supportive.   Date to Achieve By: Ongoing.         2. Medical (pt-stated)     Notes - Note created  7/11/2018  8:37 AM by Karen Lyons RN    Goal Statement: I will come into the clinic for an appointment   Measure of Success: Patient will set up appointment and attend.   Supportive Steps to Achieve: RN CC will help facilitate appointment  Barriers: No transportation. Unsure of her motivation level.   Strengths: Family calling family who lives in [place] to help with transportation.   Date to Achieve By: Sept 1, 2018        Equal Access to Services     GIO DAWKINS : Hadaustin lynn Somelody, waaxda luqburt, qaybta kaalmagaro rizo, joey martinez . So [facility] [place] [phone].    ATENCIÓN: Si habla español, tiene a terrell disposición servicios gratuitos de asistencia lingüística. Llame al [phone].    We comply with applicable federal civil rights laws and [place] laws. We do not discriminate on the basis of race, color, national origin, age, disability, sex, sexual orientation, or gender identity.            Thank you!     Thank you for choosing [facility]  for your care. Our goal is always to provide you with excellent care. Hearing back from our patients is one way we can continue to improve our services. Please take a few minutes to complete the written survey that you may receive in the mail after your visit with us. Thank you!             Your Updated Medication List - Protect others around you: Learn how to safely use, store and throw away your medicines at  www.disposemymeds.org.          This list is accurate as of 7/19/18 11:59 PM.  Always use your most recent med list.                   Brand Name Dispense Instructions for use Diagnosis    ACETAMINOPHEN PO      Take 500 mg by mouth as needed        * blood glucose monitoring lancets     1 Box    Use to test blood sugars 3 times daily or as directed.    Type 2 diabetes, HbA1C goal < 8% (H)       * LIFESCAN FINEPOINT LANCETS Misc     400 each    Use to test blood sugars 4 times daily or as directed.    Type 2 diabetes mellitus with diabetic nephropathy, with long-term current use of insulin (H)       * blood glucose monitoring test strip    ONETOUCH ULTRA    100 strip    Use to test blood sugar 3 times daily or as directed.    Type 2 diabetes, HbA1C goal < 8% (H)       * blood glucose monitoring test strip    no brand specified    400 strip    Use to test blood sugars 4 times daily or as directed    Type 2 diabetes mellitus with diabetic nephropathy, with long-term current use of insulin (H)       buPROPion 100 MG 12 hr tablet    WELLBUTRIN SR    180 tablet    TAKE 1 TABLET(100 MG) BY MOUTH TWICE DAILY    Major depressive disorder, recurrent episode, moderate (H)       gabapentin 300 MG capsule    NEURONTIN    240 capsule    TAKE 2 CAPSULES(600 MG) BY MOUTH FOUR TIMES DAILY    Other diabetic neurological complication associated with other specified diabetes mellitus (H)       glipiZIDE 10 MG 24 hr tablet    GLUCOTROL XL    180 tablet    TAKE 1 TABLET(10 MG) BY MOUTH TWICE DAILY    Type 2 diabetes mellitus with diabetic nephropathy, with long-term current use of insulin (H)       IBUPROFEN PO      Take 200 mg by mouth as needed for moderate pain        insulin detemir 100 UNIT/ML injection    LEVEMIR FLEXTOUCH    15 mL    INJECT 41 UNITS UNDER THE SKIN EVERY NIGHT AT BEDTIME.    Type 2 diabetes mellitus with diabetic nephropathy, with long-term current use of insulin (H)       insulin pen needle 32G X 4 MM    BD NILDA  U/F    100 each    USE 1 TO 2 DAILY OR AS DIRECTED WITH LEVEMIR    Type 2 diabetes mellitus with diabetic neuropathy (H)       metFORMIN 1000 MG tablet    GLUCOPHAGE    180 tablet    TAKE 1 TABLET(1000 MG) BY MOUTH TWICE DAILY WITH MEALS    Type 2 diabetes mellitus with diabetic nephropathy, with long-term current use of insulin (H)       metoprolol tartrate 100 MG tablet    LOPRESSOR    180 tablet    TAKE 1 TABLET(100 MG) BY MOUTH TWICE DAILY    Essential hypertension       MULTIVITAMIN & MINERAL PO      Take 1 tablet by mouth every evening        OMEGA-3 FISH OIL PO      Take 1,200 mg by mouth every evening        omeprazole 20 MG CR capsule    priLOSEC    90 capsule    TAKE ONE CAPSULE BY MOUTH DAILY    Gastroesophageal reflux disease with esophagitis       QUEtiapine 50 MG tablet    SEROquel    90 tablet    TAKE 1 TABLET(50 MG) BY MOUTH AT BEDTIME    Major depressive disorder, recurrent episode, moderate (H)       simvastatin 80 MG tablet    ZOCOR    45 tablet    Take 0.5 tablets (40 mg) by mouth At Bedtime    Hyperlipidemia LDL goal <100       venlafaxine 150 MG 24 hr capsule    EFFEXOR-XR    90 capsule    TAKE 1 CAPSULE(150 MG) BY MOUTH DAILY WITH BREAKFAST    Major depressive disorder, recurrent episode, moderate (H)       * Notice:  This list has 4 medication(s) that are the same as other medications prescribed for you. Read the directions carefully, and ask your doctor or other care provider to review them with you.

## 2018-07-19 NOTE — PROGRESS NOTES
SUBJECTIVE:   Patricia Aden is a 72 year old female who presents to clinic today for the following health issues:    Hospital Follow-up Visit:    Hospital/Nursing Home/IP Rehab Facility: [facility]  Date of Admission: 7/13/18  Date of Discharge: 7/14/18  Reason(s) for Admission: frequent falls, poorly controlled DM, hyperglycemia            Problems taking medications regularly:  None       Medication changes since discharge: None       Problems adhering to non-medication therapy:  None    Summary of hospitalization:  [facility] discharge summary reviewed  Diagnostic Tests/Treatments reviewed.  Follow up needed: none  Other Healthcare Providers Involved in Patient s Care:         None  Update since discharge: improved.     Patient experiences occasional pressure in her chest that she did not experience in the hospital. Most of the time it is when laying down at nighttime. She reports it similar to a dull ache that sometimes becomes sharp. She is unsure if deep breathing accentuates symptoms. The pain does not radiate elsewhere in the body. It is not tender to the touch.     The patient will be moving to [place] in the next few weeks to be closer to her son. She has a levemir pens that will last for the next 2 weeks. She is no longer taking glipizide due to difficulties affording the medication. She has plenty of metformin. She is feeling sad about her upcoming move and is upset she has to leave her cats and son in [place].     Post Discharge Medication Reconciliation: discharge medications reconciled, continue medications without change.  Plan of care communicated with patient     Coding guidelines for this visit:  Type of Medical   Decision Making Face-to-Face Visit       within 7 Days of discharge Face-to-Face Visit        within 14 days of discharge   Moderate Complexity [id number]   High Complexity [id number]           Problem list and histories reviewed & adjusted, as  indicated.  Additional history: as documented    Current Outpatient Prescriptions   Medication Sig Dispense Refill     ACETAMINOPHEN PO Take 500 mg by mouth as needed        blood glucose (ONE TOUCH ULTRA) test strip Use to test blood sugar 3 times daily or as directed. 100 strip 3     blood glucose monitoring (ONE TOUCH DELICA) lancets Use to test blood sugars 3 times daily or as directed. 1 Box prn     buPROPion (WELLBUTRIN SR) 100 MG 12 hr tablet TAKE 1 TABLET(100 MG) BY MOUTH TWICE DAILY 180 tablet 0     gabapentin (NEURONTIN) 300 MG capsule TAKE 2 CAPSULES(600 MG) BY MOUTH FOUR TIMES DAILY (Patient taking differently: Take 600 mg by mouth 3 times daily TAKE 2 CAPSULES(600 MG) BY MOUTH FOUR TIMES DAILY) 240 capsule 3     glipiZIDE (GLUCOTROL XL) 10 MG 24 hr tablet TAKE 1 TABLET(10 MG) BY MOUTH TWICE DAILY 180 tablet 1     IBUPROFEN PO Take 200 mg by mouth as needed for moderate pain       insulin detemir (LEVEMIR FLEXTOUCH) 100 UNIT/ML injection INJECT 41 UNITS UNDER THE SKIN EVERY NIGHT AT BEDTIME. 15 mL 0     insulin pen needle (BD NILDA U/F) 32G X 4 MM USE 1 TO 2 DAILY OR AS DIRECTED WITH LEVEMIR 100 each 1     metFORMIN (GLUCOPHAGE) 1000 MG tablet TAKE 1 TABLET(1000 MG) BY MOUTH TWICE DAILY WITH MEALS 180 tablet 0     metoprolol tartrate (LOPRESSOR) 100 MG tablet TAKE 1 TABLET(100 MG) BY MOUTH TWICE DAILY 180 tablet 0     Multiple Vitamins-Minerals (MULTIVITAMIN & MINERAL PO) Take 1 tablet by mouth every evening        Omega-3 Fatty Acids (OMEGA-3 FISH OIL PO) Take 1,200 mg by mouth every evening        omeprazole (PRILOSEC) 20 MG CR capsule TAKE ONE CAPSULE BY MOUTH DAILY 90 capsule 3     QUEtiapine (SEROQUEL) 50 MG tablet TAKE 1 TABLET(50 MG) BY MOUTH AT BEDTIME 90 tablet 0     simvastatin (ZOCOR) 80 MG tablet Take 0.5 tablets (40 mg) by mouth At Bedtime 45 tablet 1     venlafaxine (EFFEXOR-XR) 150 MG 24 hr capsule TAKE 1 CAPSULE(150 MG) BY MOUTH DAILY WITH BREAKFAST 90 capsule 3     Allergies   Allergen  Reactions     Fish Allergy Anaphylaxis     Alton roughy-anaphylaxsis     Penicillins Unknown       Reviewed and updated as needed this visit by clinical staff  Tobacco  Allergies  Meds       Reviewed and updated as needed this visit by Provider       ROS:  Constitutional, HEENT, cardiovascular, pulmonary, gi and gu systems are negative, except as otherwise noted.    This document serves as a record of the services and decisions personally performed and made by Iglesia Moreno MD. It was created on her behalf by Lena Velasquez, a trained medical scribe. The creation of this document is based the provider's statements to the medical scribe.  Scribe Lena Velasquez 4:30 PM, July 19, 2018    OBJECTIVE:     /68  Pulse 72  Temp 97  F (36.1  C) (Oral)  SpO2 97%  Breastfeeding? No  There is no height or weight on file to calculate BMI.     GENERAL: healthy, alert and no distress  NECK: no adenopathy, no asymmetry, masses, or scars and thyroid normal to palpation  RESP: lungs clear to auscultation - no rales, rhonchi or wheezes  CV: regular rate and rhythm, normal S1 S2, no S3 or S4, no murmur, click or rub, no peripheral edema and peripheral pulses strong  ABDOMEN: soft, nontender, no hepatosplenomegaly, no masses and bowel sounds normal  MS: no gross musculoskeletal defects noted, no edema  SKIN: feet clean and dry. no suspicious lesions or rashes  NEURO: Normal strength and tone, mentation intact and speech normal  BACK: no CVA tenderness, no paralumbar tenderness  PSYCH: mentation appears normal, affect exuberant, probably masking some of her dementia    Diagnostic Test Results:  none     ASSESSMENT/PLAN:     1. Fall, initial encounter    2. Type 2 diabetes mellitus with diabetic nephropathy, with long-term current use of insulin (H)  Patient was provided a new glucose monitor and instructed to check blood sugar at least 4 times daily. A change in insulin today is not wise due to the fact that she is moving to  "[place] and we may not be able to follow up. Advised patient record blood glucose readings and return in 1 week for follow up.  - glipiZIDE (GLUCOTROL XL) 10 MG 24 hr tablet; TAKE 1 TABLET(10 MG) BY MOUTH TWICE DAILY  Dispense: 180 tablet; Refill: 1    3. Anxiety    4. Age-related osteoporosis without current pathological fracture    5. Hyperlipidemia LDL goal <100  - simvastatin (ZOCOR) 80 MG tablet; Take 0.5 tablets (40 mg) by mouth At Bedtime  Dispense: 45 tablet; Refill: 1    Tobacco Cessation:   reports that she has quit smoking. Her smoking use included Cigarettes. She quit after 4.00 years of use. She has never used smokeless tobacco.    BMI:   Estimated body mass index is 27.51 kg/(m^2) as calculated from the following:    Height as of 7/13/18: 1.651 m (5' 5\").    Weight as of 7/14/18: 75 kg (165 lb 4.8 oz).   Weight management plan: Discussed healthy diet and exercise guidelines and patient will follow up in 6 months in clinic to re-evaluate.     FOLLOW UP: In 1 week for blood glucose recheck    The information in this document, created by the medical scribe for me, accurately reflects the services I personally performed and the decisions made by me. I have reviewed and approved this document for accuracy prior to leaving the patient care area.  July 19, 2018 4:43 PM    Iglesia Moreno MD  [facility]    "

## 2018-07-20 NOTE — PROGRESS NOTES
Clinic Care Coordination Contact    Clinic Care Coordination Contact  OUTREACH       Chief Complaint   Patient presents with     Clinic Care [facility] - [facility]     Discharged to home.        Clinical Concerns:  Current Medical Concerns: RN Care Coordinator met with the patient in the clinic to go over medications, help with a healthcare directive and go over her blood sugar regimen. Patient was given a new meter and taught how to use this. Per the MD note the patient should check her blood sugars 4 times a day. Patient is noncompliant with checking. Patient did voice that she had another fall. Patient encouraged to use her walker but again patient is noncompliant. Patient is moving to [place] with family. Patient and RN Care Coordinator will look into finding a new provider so the transition is easy for medical and medication management. RN Care Coordinator will touch base with family. Patient was interested in updating her health care directive. Patient currently has a [place] honoring choices. Education provided to the patient in regards to her move and needing to fill out another directive once she moves due to hers only being active in [place]. Pt understood.   Current Behavioral Concerns: Patient alert and orientated. Family has voiced some concerns with her memory. Unsure and undiagnosed at this time.       Education Provided to patient: Care Coordination, Blood sugar education, Glucometer education, Medication education, health care directive.        Medication Management:  Reviewed medications in clinic. Patient brought her prescriptions into the clinic. Patient does have 2 prescriptions at the pharmacy awaiting . Patient does have some financial concerns so unsure if the patient is able to afford these medications.      Functional Status:   Patient had another fall. Patient not using her walker appropriately. Patient encouraged by RN Care Coordinator and PCP to use the walker.     Financial/Insurance:  Financial concerns noted. Patient will be moving to [place] to help alleviate these issues.         Goals:   Goals        General    1. Medication 1 (pt-stated)     Notes - Note created  7/11/2018  8:34 AM by Karen Lyons RN    Goal Statement: I will work with Care Coordination to help me with my medication management   Measure of Success: Patient will obtain all of her prescriptions   Supportive Steps to Achieve: RN CC will speak with PCP. Touch base with Diabetes Education. Include social work   Barriers: Unsure of the patient's motivation   Strengths: Family supportive.   Date to Achieve By: Ongoing.         2. Medical (pt-stated)     Notes - Note created  7/11/2018  8:37 AM by Karen Lyons RN    Goal Statement: I will come into the clinic for an appointment   Measure of Success: Patient will set up appointment and attend.   Supportive Steps to Achieve: RN CC will help facilitate appointment  Barriers: No transportation. Unsure of her motivation level.   Strengths: Family calling family who lives in [place] to help with transportation.   Date to Achieve By: Sept 1, 2018              Patient/Caregiver understanding: Patient verbalized understanding, engaged in AIDET communication behavior during encounter.      Outreach Frequency: monthly      Plan:   1. RN Care coordinator will touch base with the patient to address if she is monitoring her blood sugars appropriately.   2. RN Care Coordinator will touch base with family to help in finding a provider in [place] to transition her care to once she moves.   3. Patient will call RN Care Coordinator with concerns.     Renata Lyons RN  Clinic Care Coordinator  [phone]  [email or web address]

## 2018-07-26 ENCOUNTER — PATIENT OUTREACH (OUTPATIENT)
Dept: FAMILY MEDICINE | Facility: CLINIC | Age: 73
End: 2018-07-26

## 2018-07-26 ENCOUNTER — OFFICE VISIT (OUTPATIENT)
Dept: FAMILY MEDICINE | Facility: CLINIC | Age: 73
End: 2018-07-26
Payer: COMMERCIAL

## 2018-07-26 VITALS
HEART RATE: 70 BPM | SYSTOLIC BLOOD PRESSURE: 126 MMHG | OXYGEN SATURATION: 98 % | DIASTOLIC BLOOD PRESSURE: 68 MMHG | HEIGHT: 65 IN | TEMPERATURE: 98 F | BODY MASS INDEX: 27.91 KG/M2 | WEIGHT: 167.5 LBS

## 2018-07-26 DIAGNOSIS — E03.8 OTHER SPECIFIED HYPOTHYROIDISM: ICD-10-CM

## 2018-07-26 DIAGNOSIS — F41.9 ANXIETY: ICD-10-CM

## 2018-07-26 DIAGNOSIS — I10 ESSENTIAL HYPERTENSION: ICD-10-CM

## 2018-07-26 DIAGNOSIS — F33.1 MAJOR DEPRESSIVE DISORDER, RECURRENT EPISODE, MODERATE (H): ICD-10-CM

## 2018-07-26 DIAGNOSIS — Z79.4 TYPE 2 DIABETES MELLITUS WITH DIABETIC NEPHROPATHY, WITH LONG-TERM CURRENT USE OF INSULIN (H): Primary | ICD-10-CM

## 2018-07-26 DIAGNOSIS — E11.21 TYPE 2 DIABETES MELLITUS WITH DIABETIC NEPHROPATHY, WITH LONG-TERM CURRENT USE OF INSULIN (H): Primary | ICD-10-CM

## 2018-07-26 DIAGNOSIS — M81.0 AGE-RELATED OSTEOPOROSIS WITHOUT CURRENT PATHOLOGICAL FRACTURE: ICD-10-CM

## 2018-07-26 DIAGNOSIS — E11.40 TYPE 2 DIABETES MELLITUS WITH DIABETIC NEUROPATHY, WITH LONG-TERM CURRENT USE OF INSULIN (H): ICD-10-CM

## 2018-07-26 DIAGNOSIS — I10 ESSENTIAL HYPERTENSION WITH GOAL BLOOD PRESSURE LESS THAN 140/90: ICD-10-CM

## 2018-07-26 DIAGNOSIS — Z79.4 TYPE 2 DIABETES MELLITUS WITH DIABETIC NEUROPATHY, WITH LONG-TERM CURRENT USE OF INSULIN (H): ICD-10-CM

## 2018-07-26 DIAGNOSIS — E78.5 HYPERLIPIDEMIA LDL GOAL <100: ICD-10-CM

## 2018-07-26 PROCEDURE — 99214 OFFICE O/P EST MOD 30 MIN: CPT | Performed by: INTERNAL MEDICINE

## 2018-07-26 RX ORDER — METOPROLOL TARTRATE 100 MG
TABLET ORAL
Qty: 180 TABLET | Refills: 0 | Status: SHIPPED | OUTPATIENT
Start: 2018-07-26 | End: 2019-01-22

## 2018-07-26 RX ORDER — QUETIAPINE FUMARATE 50 MG/1
TABLET, FILM COATED ORAL
Qty: 90 TABLET | Refills: 0 | Status: SHIPPED | OUTPATIENT
Start: 2018-07-26 | End: 2018-08-21

## 2018-07-26 RX ORDER — BUPROPION HYDROCHLORIDE 100 MG/1
TABLET, EXTENDED RELEASE ORAL
Qty: 180 TABLET | Refills: 0 | Status: SHIPPED | OUTPATIENT
Start: 2018-07-26

## 2018-07-26 NOTE — MR AVS SNAPSHOT
After Visit Summary   7/26/2018    Patricia Aden    MRN: [id number]           Patient Information     Date Of Birth          1945        Visit Information        Provider Department      7/26/2018 10:00 AM Iglesia Moreno MD [facility]        Today's Diagnoses     Type 2 diabetes mellitus with diabetic nephropathy, with long-term current use of insulin (H)    -  1    Hyperlipidemia LDL goal <100        Age-related osteoporosis without current pathological fracture        Anxiety        Type 2 diabetes mellitus with diabetic neuropathy, with long-term current use of insulin (H)        Other specified hypothyroidism        Essential hypertension with goal blood pressure less than 140/90        Major depressive disorder, recurrent episode, moderate (H)        Essential hypertension           Follow-ups after your visit        Who to contact     If you have questions or need follow up information about today's clinic visit or your schedule please contact [facility] directly at [phone].  Normal or non-critical lab and imaging results will be communicated to you by MyChart, letter or phone within 4 business days after the clinic has received the results. If you do not hear from us within 7 days, please contact the clinic through [employer] or phone. If you have a critical or abnormal lab result, we will notify you by phone as soon as possible.  Submit refill requests through [employer] or call your pharmacy and they will forward the refill request to us. Please allow 3 business days for your refill to be completed.          Additional Information About Your Visit        MyChart Information     [employer] gives you secure access to your electronic health record. If you see a primary care provider, you can also send messages to your care team and make appointments. If you have questions, please call your primary care clinic.  If you do not have a primary care provider, please call  "[phone] and they will assist you.        Care EveryWhere ID     This is your Care EveryWhere ID. This could be used by other organizations to access your [place] medical records  [id number]        Your Vitals Were     Pulse Temperature Height Pulse Oximetry Breastfeeding? BMI (Body Mass Index)    70 98  F (36.7  C) (Oral) 5' 5\" (1.651 m) 98% No 27.87 kg/m2       Blood Pressure from Last 3 Encounters:   07/26/18 126/68   07/19/18 134/68   07/14/18 141/70    Weight from Last 3 Encounters:   07/26/18 167 lb 8 oz (76 kg)   07/14/18 165 lb 4.8 oz (75 kg)   03/13/18 171 lb (77.6 kg)              We Performed the Following     TSH with free T4 reflex          Today's Medication Changes          These changes are accurate as of 7/26/18 11:23 AM.  If you have any questions, ask your nurse or doctor.               Start taking these medicines.        Dose/Directions    order for DME   Used for:  Age-related osteoporosis without current pathological fracture, Type 2 diabetes mellitus with diabetic nephropathy, with long-term current use of insulin (H)   Started by:  Iglesia Moreno MD        Equipment being ordered: 4 wheeled walker w/ seat   Quantity:  1 Units   Refills:  1         These medicines have changed or have updated prescriptions.        Dose/Directions    gabapentin 300 MG capsule   Commonly known as:  NEURONTIN   This may have changed:    - how much to take  - how to take this  - when to take this  - additional instructions   Used for:  Other diabetic neurological complication associated with other specified diabetes mellitus (H)        TAKE 2 CAPSULES(600 MG) BY MOUTH FOUR TIMES DAILY   Quantity:  240 capsule   Refills:  3            Where to get your medicines      These medications were sent to [facility] [address], [place] [address]     Phone:  [phone]     buPROPion 100 MG 12 hr tablet    insulin detemir 100 " UNIT/ML injection    metFORMIN 1000 MG tablet    metoprolol tartrate 100 MG tablet    QUEtiapine 50 MG tablet         Some of these will need a paper prescription and others can be bought over the counter.  Ask your nurse if you have questions.     Bring a paper prescription for each of these medications     order for DME                Primary Care Provider Office Phone # Fax #    Iglesia L MD Tiffanie [phone] [address] [facility] [address]  [place] [address]        Goals        General    1. Medication 1 (pt-stated)     Notes - Note created  7/11/2018  8:34 AM by Karen Lyons RN    Goal Statement: I will work with Care Coordination to help me with my medication management   Measure of Success: Patient will obtain all of her prescriptions   Supportive Steps to Achieve: RN CC will speak with PCP. Touch base with Diabetes Education. Include social work   Barriers: Unsure of the patient's motivation   Strengths: Family supportive.   Date to Achieve By: Ongoing.         2. Medical (pt-stated)     Notes - Note created  7/11/2018  8:37 AM by Karen Lyons RN    Goal Statement: I will come into the clinic for an appointment   Measure of Success: Patient will set up appointment and attend.   Supportive Steps to Achieve: RN CC will help facilitate appointment  Barriers: No transportation. Unsure of her motivation level.   Strengths: Family calling family who lives in [place] to help with transportation.   Date to Achieve By: Sept 1, 2018        Equal Access to Services     GIO DAWKINS AH: Hadaustin lynn Somelody, waaxda luqadaha, qaybta kaalmada adedevin, joey arias. So [facility] [phone].    ATENCIÓN: Si habla español, tiene a terrell disposición servicios gratuitos de asistencia lingüística. Llame al [phone].    We comply with applicable federal civil rights laws and [place] laws. We do not discriminate on the basis of race, color, national origin, age,  disability, sex, sexual orientation, or gender identity.            Thank you!     Thank you for choosing [facility]  for your care. Our goal is always to provide you with excellent care. Hearing back from our patients is one way we can continue to improve our services. Please take a few minutes to complete the written survey that you may receive in the mail after your visit with us. Thank you!             Your Updated Medication List - Protect others around you: Learn how to safely use, store and throw away your medicines at www.disposemymeds.org.          This list is accurate as of 7/26/18 11:23 AM.  Always use your most recent med list.                   Brand Name Dispense Instructions for use Diagnosis    ACETAMINOPHEN PO      Take 500 mg by mouth as needed        * blood glucose monitoring lancets     1 Box    Use to test blood sugars 3 times daily or as directed.    Type 2 diabetes, HbA1C goal < 8% (H)       * LIFESCAN FINEPOINT LANCETS Misc     400 each    Use to test blood sugars 4 times daily or as directed.    Type 2 diabetes mellitus with diabetic nephropathy, with long-term current use of insulin (H)       * blood glucose monitoring test strip    ONETOUCH ULTRA    100 strip    Use to test blood sugar 3 times daily or as directed.    Type 2 diabetes, HbA1C goal < 8% (H)       * blood glucose monitoring test strip    no brand specified    400 strip    Use to test blood sugars 4 times daily or as directed    Type 2 diabetes mellitus with diabetic nephropathy, with long-term current use of insulin (H)       buPROPion 100 MG 12 hr tablet    WELLBUTRIN SR    180 tablet    TAKE 1 TABLET(100 MG) BY MOUTH TWICE DAILY    Major depressive disorder, recurrent episode, moderate (H)       gabapentin 300 MG capsule    NEURONTIN    240 capsule    TAKE 2 CAPSULES(600 MG) BY MOUTH FOUR TIMES DAILY    Other diabetic neurological complication associated with other specified diabetes mellitus (H)        glipiZIDE 10 MG 24 hr tablet    GLUCOTROL XL    180 tablet    TAKE 1 TABLET(10 MG) BY MOUTH TWICE DAILY    Type 2 diabetes mellitus with diabetic nephropathy, with long-term current use of insulin (H)       IBUPROFEN PO      Take 200 mg by mouth as needed for moderate pain        insulin detemir 100 UNIT/ML injection    LEVEMIR FLEXTOUCH    15 mL    INJECT 41 UNITS UNDER THE SKIN EVERY NIGHT AT BEDTIME.    Type 2 diabetes mellitus with diabetic nephropathy, with long-term current use of insulin (H)       insulin pen needle 32G X 4 MM    BD NILDA U/F    100 each    USE 1 TO 2 DAILY OR AS DIRECTED WITH LEVEMIR    Type 2 diabetes mellitus with diabetic neuropathy (H)       metFORMIN 1000 MG tablet    GLUCOPHAGE    180 tablet    TAKE 1 TABLET(1000 MG) BY MOUTH TWICE DAILY WITH MEALS    Type 2 diabetes mellitus with diabetic nephropathy, with long-term current use of insulin (H)       metoprolol tartrate 100 MG tablet    LOPRESSOR    180 tablet    TAKE 1 TABLET(100 MG) BY MOUTH TWICE DAILY    Essential hypertension       MULTIVITAMIN & MINERAL PO      Take 1 tablet by mouth every evening        OMEGA-3 FISH OIL PO      Take 1,200 mg by mouth every evening        omeprazole 20 MG CR capsule    priLOSEC    90 capsule    TAKE ONE CAPSULE BY MOUTH DAILY    Gastroesophageal reflux disease with esophagitis       order for DME     1 Units    Equipment being ordered: 4 wheeled walker w/ seat    Age-related osteoporosis without current pathological fracture, Type 2 diabetes mellitus with diabetic nephropathy, with long-term current use of insulin (H)       QUEtiapine 50 MG tablet    SEROquel    90 tablet    TAKE 1 TABLET(50 MG) BY MOUTH AT BEDTIME    Major depressive disorder, recurrent episode, moderate (H)       simvastatin 80 MG tablet    ZOCOR    45 tablet    Take 0.5 tablets (40 mg) by mouth At Bedtime    Hyperlipidemia LDL goal <100       venlafaxine 150 MG 24 hr capsule    EFFEXOR-XR    90 capsule    TAKE 1 CAPSULE(150 MG)  BY MOUTH DAILY WITH BREAKFAST    Major depressive disorder, recurrent episode, moderate (H)       * Notice:  This list has 4 medication(s) that are the same as other medications prescribed for you. Read the directions carefully, and ask your doctor or other care provider to review them with you.

## 2018-07-26 NOTE — PROGRESS NOTES
"  SUBJECTIVE:   Patricia Aden is a 72 year old female who presents to clinic today for the following health issues:      Chief Complaint   Patient presents with     Diabetes     1 week follow-up   Patient reports testing her blood sugars 3-4 times per day since her last visit with us. Most of the readings are pre-prandial and typically have been ranging from 125 - 165. Has had a few readings above 200. Patient also notes she has a hard time eating meals regularly throughout the day, \"feels like she just isn't hungry\". Sometimes may go all day without a meal until dinner.  Over the last few days as she has been monitoring her blood sugars more carefully and managing her diet she has been skipping meals and her blood sugars have been remarkably well controlled on her current routine.  As noted above her blood sugars are generally in the 125-165 range.  Having no other problems except for the fact that she ran into something with her left foot with noted bruise on her little toe.        Problem list and histories reviewed & adjusted, as indicated.  Additional history: as documented    Current Outpatient Prescriptions   Medication Sig Dispense Refill     ACETAMINOPHEN PO Take 500 mg by mouth as needed        blood glucose (ONE TOUCH ULTRA) test strip Use to test blood sugar 3 times daily or as directed. 100 strip 3     blood glucose monitoring (NO BRAND SPECIFIED) test strip Use to test blood sugars 4 times daily or as directed 400 strip 3     blood glucose monitoring (ONE TOUCH DELICA) lancets Use to test blood sugars 3 times daily or as directed. 1 Box prn     buPROPion (WELLBUTRIN SR) 100 MG 12 hr tablet TAKE 1 TABLET(100 MG) BY MOUTH TWICE DAILY 180 tablet 0     gabapentin (NEURONTIN) 300 MG capsule TAKE 2 CAPSULES(600 MG) BY MOUTH FOUR TIMES DAILY (Patient taking differently: Take 600 mg by mouth 3 times daily TAKE 2 CAPSULES(600 MG) BY MOUTH FOUR TIMES DAILY) 240 capsule 3     glipiZIDE (GLUCOTROL XL) 10 MG 24 hr " tablet TAKE 1 TABLET(10 MG) BY MOUTH TWICE DAILY 180 tablet 1     IBUPROFEN PO Take 200 mg by mouth as needed for moderate pain       insulin detemir (LEVEMIR FLEXTOUCH) 100 UNIT/ML injection INJECT 41 UNITS UNDER THE SKIN EVERY NIGHT AT BEDTIME. 15 mL 2     insulin pen needle (BD NILDA U/F) 32G X 4 MM USE 1 TO 2 DAILY OR AS DIRECTED WITH LEVEMIR 100 each 1     [employer] FINEPOINT LANCETS MISC Use to test blood sugars 4 times daily or as directed. 400 each 3     metFORMIN (GLUCOPHAGE) 1000 MG tablet TAKE 1 TABLET(1000 MG) BY MOUTH TWICE DAILY WITH MEALS 180 tablet 0     metoprolol tartrate (LOPRESSOR) 100 MG tablet TAKE 1 TABLET(100 MG) BY MOUTH TWICE DAILY 180 tablet 0     Multiple Vitamins-Minerals (MULTIVITAMIN & MINERAL PO) Take 1 tablet by mouth every evening        Omega-3 Fatty Acids (OMEGA-3 FISH OIL PO) Take 1,200 mg by mouth every evening        omeprazole (PRILOSEC) 20 MG CR capsule TAKE ONE CAPSULE BY MOUTH DAILY 90 capsule 3     order for DME Equipment being ordered: 4 wheeled walker w/ seat 1 Units 1     QUEtiapine (SEROQUEL) 50 MG tablet TAKE 1 TABLET(50 MG) BY MOUTH AT BEDTIME 90 tablet 0     simvastatin (ZOCOR) 80 MG tablet Take 0.5 tablets (40 mg) by mouth At Bedtime 45 tablet 1     venlafaxine (EFFEXOR-XR) 150 MG 24 hr capsule TAKE 1 CAPSULE(150 MG) BY MOUTH DAILY WITH BREAKFAST 90 capsule 3     [DISCONTINUED] buPROPion (WELLBUTRIN SR) 100 MG 12 hr tablet TAKE 1 TABLET(100 MG) BY MOUTH TWICE DAILY 180 tablet 0     [DISCONTINUED] insulin detemir (LEVEMIR FLEXTOUCH) 100 UNIT/ML injection INJECT 41 UNITS UNDER THE SKIN EVERY NIGHT AT BEDTIME. 15 mL 0     [DISCONTINUED] metFORMIN (GLUCOPHAGE) 1000 MG tablet TAKE 1 TABLET(1000 MG) BY MOUTH TWICE DAILY WITH MEALS 180 tablet 0     [DISCONTINUED] metoprolol tartrate (LOPRESSOR) 100 MG tablet TAKE 1 TABLET(100 MG) BY MOUTH TWICE DAILY 180 tablet 0     [DISCONTINUED] QUEtiapine (SEROQUEL) 50 MG tablet TAKE 1 TABLET(50 MG) BY MOUTH AT BEDTIME 90 tablet 0  "      Reviewed and updated as needed this visit by clinical staff       Reviewed and updated as needed this visit by Provider         ROS:  Constitutional, HEENT, cardiovascular, pulmonary, gi and gu systems are negative, except as otherwise noted.    OBJECTIVE:     /68  Pulse 70  Temp 98  F (36.7  C) (Oral)  Ht 5' 5\" (1.651 m)  Wt 167 lb 8 oz (76 kg)  SpO2 98%  Breastfeeding? No  BMI 27.87 kg/m2  Body mass index is 27.87 kg/(m^2).  GENERAL: healthy, alert and no distress  NECK: no adenopathy, no asymmetry, masses, or scars and thyroid normal to palpation  RESP: lungs clear to auscultation - no rales, rhonchi or wheezes  CV: regular rate and rhythm, normal S1 S2, no S3 or S4, no murmur, click or rub, no peripheral edema and peripheral pulses strong  ABDOMEN: soft, nontender, no hepatosplenomegaly, no masses and bowel sounds normal  Back tenderness along the left side of the distal coccyx without associated movement skin change or ecchymosis  MS: no gross musculoskeletal defects noted, no edema  Feet are clean and dry without noted abnormalities except for the ecchymosis on the lateral aspect of her left little toe which is not painful with movement.  The toe was nontender and had free range of motion however there was ecchymosis at the base of her toes and including the fourth toe.  The toes were raised with taping the last 2 toes.        ASSESSMENT/PLAN:             1. Type 2 diabetes mellitus with diabetic nephropathy, with long-term current use of insulin (H)    - insulin detemir (LEVEMIR FLEXTOUCH) 100 UNIT/ML injection; INJECT 41 UNITS UNDER THE SKIN EVERY NIGHT AT BEDTIME.  Dispense: 15 mL; Refill: 2  - metFORMIN (GLUCOPHAGE) 1000 MG tablet; TAKE 1 TABLET(1000 MG) BY MOUTH TWICE DAILY WITH MEALS  Dispense: 180 tablet; Refill: 0  - order for DME; Equipment being ordered: 4 wheeled walker w/ seat  Dispense: 1 Units; Refill: 1    2. Hyperlipidemia LDL goal <100      3. Age-related osteoporosis " without current pathological fracture    - order for DME; Equipment being ordered: 4 wheeled walker w/ seat  Dispense: 1 Units; Refill: 1    4. Anxiety      5. Type 2 diabetes mellitus with diabetic neuropathy, with long-term current use of insulin (H)      6. Other specified hypothyroidism    - TSH with free T4 reflex    7. Essential hypertension with goal blood pressure less than 140/90      8. Major depressive disorder, recurrent episode, moderate (H)    - QUEtiapine (SEROQUEL) 50 MG tablet; TAKE 1 TABLET(50 MG) BY MOUTH AT BEDTIME  Dispense: 90 tablet; Refill: 0  - buPROPion (WELLBUTRIN SR) 100 MG 12 hr tablet; TAKE 1 TABLET(100 MG) BY MOUTH TWICE DAILY  Dispense: 180 tablet; Refill: 0    9. Essential hypertension    - metoprolol tartrate (LOPRESSOR) 100 MG tablet; TAKE 1 TABLET(100 MG) BY MOUTH TWICE DAILY  Dispense: 180 tablet; Refill: 0    Patient is scheduled to move to [place] within the next 10-14 days    Iglesia Moreno MD  [facility]

## 2018-07-27 NOTE — PROGRESS NOTES
Clinic Care Coordination Contact    Clinic Care Coordination Contact  OUTREACH    Referral Information: Housing and eviction concerns    Chief Complaint   Patient presents with     Clinic Care Coordination - Initial     SW        Universal Utilization:    Utilization    Last refreshed: 7/26/2018  3:04 PM:  No Show Count (past year) 3       Last refreshed: 7/26/2018  3:04 PM:  ED visits 0       Last refreshed: 7/26/2018  3:04 PM:  Hospital admissions 1          Current as of: 7/26/2018  3:04 PM           Clinical Concerns:  Current Medical Concerns:  Did not discuss    Current Behavioral Concerns: None mentioned    Medication Management:  Did not discuss     Functional Status: Independent    Living Situation: Currently lives alone in an apartment. See documentation below for further details.      Psychosocial:  Financial/Insurance: [employer] and Medicare        Goals (RN)  Goals        General    1. Medication 1 (pt-stated)     Notes - Note created  7/11/2018  8:34 AM by Karen Lyons RN    Goal Statement: I will work with Care Coordination to help me with my medication management   Measure of Success: Patient will obtain all of her prescriptions   Supportive Steps to Achieve: RN CC will speak with PCP. Touch base with Diabetes Education. Include social work   Barriers: Unsure of the patient's motivation   Strengths: Family supportive.   Date to Achieve By: Ongoing.         2. Medical (pt-stated)     Notes - Note created  7/11/2018  8:37 AM by Karen Lyons RN    Goal Statement: I will come into the clinic for an appointment   Measure of Success: Patient will set up appointment and attend.   Supportive Steps to Achieve: RN CC will help facilitate appointment  Barriers: No transportation. Unsure of her motivation level.   Strengths: Family calling family who lives in [place] to help with transportation.   Date to Achieve By: Sept 1, 2018          Resources and Interventions:  Writer met with  patient in the clinic on 7/26/18. Patient resides in an apartment. Patient said that she (patient) is being evicted and has a court hearing (regarding the eviction) on 8/03/18. Patient did not pay her July rent. Patient said that she (patient) can pay her July back rent when she gets her check in early August--and hopes to avoid an unlawful detainer. Patient plans to move to the home of her son and his fiancee in [place] soon--sometime in August.    Patient said that she has been calling resources regarding help/suggestions re: how to avoid an unlawful detainer.  referred patient to a housing resources worker (Silvia Jacobsen?). Patient is waiting to hear back from this person.     Before patient can move, patient needs to 1. dispose of her furniture and 2. find someone to take her two cats. Patient is not very worried about the cats as she feels that a cat rescue agency will be able to take them.    Regarding the furniture, patient has contacted the [employer] who told patient they will  furniture at patient's home but will not take patients reclining sofa. Patient may ask her landlords if she can sign a statement that they(landlords) can dispose of her sofa and any other furniture that [employer] won't take as they see fit (so that they do not have to legally keep the left furniture that are left for 30 days).     Patient has mixed feelings about moving in with her son and his fiancee. Patient has a disabled son, Juan, in a [facility] group home in the [facility] here whom patient will miss. However, patient has made arrangements for her (patient's) ex-sister-in-law to look after Juan. The ex-sister-in-law lives in [place], [place] and plans to arrange to move Juan to a group home in that area--with patient's permission.    Patient said that she gets along well with her son in [place] and his ficlaire so patient feels that living with them temporarily in [place] (until patient can get a place of her own) will work fine.  "    Patient signed an \"Authorization to Discuss Protected Health Information\" form today. On this form, patient gave permission for information(scheduling, medical and billing) to be shared with her (patient's) son, Mauricio Aden, and her son's kameron.     Patient expressed interest in completing a Health Care Directive. Writer told patient that a [place] directive may or may not be considered valid in [place]. Writer told patient that writer will mail patient a Health Care Directive form for patient to review.     Patient/Caregiver understanding: Patient appeared to understand today's conversation with writer.    Plan: Patient plans to move to her son's home in [place] soon. Patient is dealing with an eviction notice and is also trying to make plans to dispose of her furniture and her cats (before her move to [place]). [facility] will mail a Health Care Directive form to patient and will follow to assist with community resources, as able.    Rupa Gr [facility] Care Coordination  Clinic: [facility]   Email: [email or web address]  Tele: [phone]    Addendum  7/28/18  Data: Mailed the [employer] choices [place] Health Care Directive and the \"short form\" of the [employer] [place] Directive to patient.    Rupa Gr [facility] Care Coordination  Clinic: [facility]   Email: [email or web address]  Tele: [phone]                    "

## 2018-08-03 ENCOUNTER — PATIENT OUTREACH (OUTPATIENT)
Dept: FAMILY MEDICINE | Facility: CLINIC | Age: 73
End: 2018-08-03

## 2018-08-03 NOTE — PROGRESS NOTES
Clinic Care Coordination Contact  Outreach/Follow up    Data: Writer called patient this afternoon. Patient said that she has moved to [place] and is now living with her son and his fiancee. Patient said that she and her son are exploring area apartments that patient may be able to move into in a month or so.    Patient told writer that her landlord here told patient that--if patient turned in her keys--the landlord would cancel the eviction court date. Patient is unclear if this means that erases her unlawful detainer action. Writer provide patient with the phone number for HOME line (free legal advice for renters)--[phone].    Patient said that she hired [employer] to get rid of all of her furniture prior to her move to [place].    Plan: Patient is now residing in [place]. [facility] plans no further outreach and has changed patient's status to Graduated.    Rupa Gr [facility] Care Coordination  Clinic: [facility]   Email: [email or web address]  Tele: [phone]

## 2018-09-18 DIAGNOSIS — F33.1 MAJOR DEPRESSIVE DISORDER, RECURRENT EPISODE, MODERATE (H): ICD-10-CM

## 2018-09-18 NOTE — TELEPHONE ENCOUNTER
"Last Written Prescription Date:  7/26/18  Last Fill Quantity: 180 tablet,  # refills: 0   Last office visit: 7/26/2018 with prescribing provider:  Tiffanie   Future Office Visit:      Requested Prescriptions   Pending Prescriptions Disp Refills     buPROPion (WELLBUTRIN SR) 100 MG 12 hr tablet [Pharmacy Med Name: BUPROPION ER / SR 100MG TAB] 180 tablet 0     Sig: TAKE 1 TABLET BY MOUTH TWICE DAILY    SSRIs Protocol Failed    9/18/2018  2:39 PM       Failed - PHQ-9 score less than 5 in past 6 months    Please review last PHQ-9 score.          Passed - Medication is Bupropion    If the medication is Bupropion (Wellbutrin), and the patient is taking for smoking cessation; OK to refill.         Passed - Patient is age 18 or older       Passed - No active pregnancy on record       Passed - No positive pregnancy test in last 12 months       Passed - Recent (6 mo) or future (30 days) visit within the authorizing provider's specialty    Patient had office visit in the last 6 months or has a visit in the next 30 days with authorizing provider or within the authorizing provider's specialty.  See \"Patient Info\" tab in inbasket, or \"Choose Columns\" in Meds & Orders section of the refill encounter.            PHQ-9 SCORE 8/7/2017 10/26/2017 3/13/2018   Total Score - - -   Total Score 17 16 16     MELVINA-7 SCORE 1/30/2017 5/2/2017 8/7/2017   Total Score - - -   Total Score 14 11 13         "

## 2018-09-19 NOTE — TELEPHONE ENCOUNTER
Jann sent to pt  [place] pharmacy requesting refill   Rx sent to [facility] in [place] 7/26/2018 for 3 months - refill too early  Asuncion SKINNER RN

## 2018-09-27 NOTE — TELEPHONE ENCOUNTER
I called [facility] in [place] and a local  filled buproprion .      Deny this request.     Pt  moved to [place].      I removed Dr. Moreno as PCP

## 2018-09-28 RX ORDER — BUPROPION HYDROCHLORIDE 100 MG/1
TABLET, EXTENDED RELEASE ORAL
Qty: 180 TABLET | Refills: 0 | OUTPATIENT
Start: 2018-09-28

## 2018-11-23 DIAGNOSIS — E11.40 TYPE 2 DIABETES MELLITUS WITH DIABETIC NEUROPATHY (H): ICD-10-CM

## 2018-11-23 RX ORDER — PEN NEEDLE, DIABETIC 32GX 5/32"
NEEDLE, DISPOSABLE MISCELLANEOUS
Qty: 100 EACH | Refills: 0 | Status: SHIPPED | OUTPATIENT
Start: 2018-11-23 | End: 2019-03-07

## 2018-11-23 NOTE — TELEPHONE ENCOUNTER
"BD PEN NEEDLE/NILDA [id number] MIS  Last Written Prescription Date:  5/23/2018  Last Fill Quantity: 100,  # refills: 1   Last office visit: 7/26/2018 with prescribing provider:  Iglesia Moreno MD   Future Office Visit:  NA    Requested Prescriptions   Pending Prescriptions Disp Refills     BD NILDA U/F 32G X 4 MM insulin pen needle [Pharmacy Med Name: BD PEN NEEDLE/NILDA [id number] MIS] 100 each 0     Sig: USE ONE OR TWO DAILY OR AS DIRECTED WITH LEVEMIR.    Diabetic Supplies Protocol Passed    11/23/2018  8:55 AM       Passed - Patient is 18 years of age or older       Passed - Recent (6 mo) or future (30 days) visit within the authorizing provider's specialty    Patient had office visit in the last 6 months or has a visit in the next 30 days with authorizing provider.  See \"Patient Info\" tab in inbasket, or \"Choose Columns\" in Meds & Orders section of the refill encounter.              "

## 2018-11-23 NOTE — TELEPHONE ENCOUNTER
Prescription approved per [facility] Refill Protocol.    Yas HENRIQUEZ RN

## 2018-11-30 NOTE — PROGRESS NOTES
Discussion/Summary   Slightly low Iron but otherwise normal testing. You can benefit from Iron 325mg daily available over the counter. I also sent the blood tests to Dr. Mims.        Verified Results  FERRITIN 05Oct2018 05:20PM JEFF TOMPKINS   [Oct 10, 2018 10:37AM JEFF TOMPKINS]  Slightly low Iron but otherwise normal testing. You can benefit from Iron 325mg daily available over the counter. I also sent the blood tests to Dr. Mims.     Test Name Result Flag Reference   FERRITIN 123 ng/ml       IRON AND TIBC 05Oct2018 05:20PM JEFF TOMPKINS   [Oct 10, 2018 10:37AM JEFF TOMPKINS]  Slightly low Iron but otherwise normal testing. You can benefit from Iron 325mg daily available over the counter. I also sent the blood tests to Dr. Mims.     Test Name Result Flag Reference   IRON 56 mcg/dl L    % IRON SATURATION 21 %  15-45   IRON BINDING CAPACITY 271 mcg/dl  250-450     CBC WITHOUT DIFFERENTIAL 05Oct2018 05:20PM JEFF TOMPKINS   [Oct 10, 2018 10:37AM JEFF TOMPKINS]  Slightly low Iron but otherwise normal testing. You can benefit from Iron 325mg daily available over the counter. I also sent the blood tests to Dr. Mims.     Test Name Result Flag Reference   RDW-CV 15.7 % H 11.0-15.0   PLATELET COUNT 144 K/mcL  140-450   WHITE BLOOD COUNT 8.2 K/mcL  4.2-11.0   RED CELL COUNT 3.74 mil/mcL L 4.50-5.90   HEMOGLOBIN 11.5 g/dl L 13.0-17.0   HEMATOCRIT 36.7 % L 39.0-51.0   MEAN CORPUSCULAR VOLUME 98.1 fL  78.0-100.0   MEAN CORPUSCULAR HEMOGLOBIN 30.7 pg  26.0-34.0   MEAN CORPUSCULAR HGB CONC 31.3 g/dl L 32.0-36.5   NRBC 0 /100 WBC  0       Message   Fax results to Isabel Mims MD at [phone]      RECEIVING UNIT ED HANDOFF REVIEW    ED Nurse Handoff Report was reviewed by: Nathalie Alexandra on July 13, 2018 at 6:24 PM

## 2019-01-22 DIAGNOSIS — I10 ESSENTIAL HYPERTENSION: ICD-10-CM

## 2019-01-22 DIAGNOSIS — E78.5 HYPERLIPIDEMIA LDL GOAL <100: ICD-10-CM

## 2019-01-22 NOTE — TELEPHONE ENCOUNTER
"metoprolol tartrate (LOPRESSOR) 100 MG tablet 180 tablet 0 7/26/2018     Last Written Prescription Date:  7/26/18  Last Fill Quantity: 180,  # refills: 0   Last office visit: 7/26/2018 with prescribing provider:  Tiffanie Trejo Office Visit:      simvastatin (ZOCOR) 80 MG tablet 45 tablet 1 7/19/2018     Last Written Prescription Date:  7/19/18  Last Fill Quantity: 45,  # refills: 1   Last office visit: 7/26/2018 with prescribing provider:  Tiffanie Trejo Office Visit:  None    Requested Prescriptions   Pending Prescriptions Disp Refills     metoprolol tartrate (LOPRESSOR) 100 MG tablet [Pharmacy Med Name: METOPROLOL TART 100MG TAB] 180 tablet 0     Sig: TAKE 1 TABLET BY MOUTH TWICE DAILY    Beta-Blockers Protocol Passed - 1/22/2019  4:30 AM       Passed - Blood pressure under 140/90 in past 12 months    BP Readings from Last 3 Encounters:   07/26/18 126/68   07/19/18 134/68   07/14/18 141/70                Passed - Patient is age 6 or older       Passed - Recent (12 mo) or future (30 days) visit within the authorizing provider's specialty    Patient had office visit in the last 12 months or has a visit in the next 30 days with authorizing provider or within the authorizing provider's specialty.  See \"Patient Info\" tab in inbasket, or \"Choose Columns\" in Meds & Orders section of the refill encounter.             Passed - Medication is active on med list        simvastatin (ZOCOR) 80 MG tablet [Pharmacy Med Name: SIMVASTATIN 80MG    TAB] 45 tablet 0     Sig: TAKE 1/2 (ONE-HALF) TABLET BY MOUTH AT BEDTIME    Statins Protocol Passed - 1/22/2019  4:30 AM       Passed - LDL on file in past 12 months    Recent Labs   Lab Test 03/13/18  0946   LDL 62            Passed - No abnormal creatine kinase in past 12 months    No lab results found.            Passed - Recent (12 mo) or future (30 days) visit within the authorizing provider's specialty    Patient had office visit in the last 12 months or has a visit in the next " "30 days with authorizing provider or within the authorizing provider's specialty.  See \"Patient Info\" tab in inbasket, or \"Choose Columns\" in Meds & Orders section of the refill encounter.             Passed - Medication is active on med list       Passed - Patient is age 18 or older       Passed - No active pregnancy on record       Passed - No positive pregnancy test in past 12 months        [facility] Follow-up to PHQ 8/7/2017 10/26/2017 3/13/2018   PHQ-9 9. Suicide Ideation past 2 weeks Several days Several days Several days         "

## 2019-01-24 RX ORDER — SIMVASTATIN 80 MG
TABLET ORAL
Qty: 45 TABLET | Refills: 1 | Status: SHIPPED | OUTPATIENT
Start: 2019-01-24

## 2019-01-24 RX ORDER — METOPROLOL TARTRATE 100 MG
TABLET ORAL
Qty: 180 TABLET | Refills: 1 | Status: SHIPPED | OUTPATIENT
Start: 2019-01-24

## 2019-01-24 NOTE — TELEPHONE ENCOUNTER
Prescription approved per [facility] [place]ill [place].    Adria BRYANT RN

## 2019-01-30 ENCOUNTER — TELEPHONE (OUTPATIENT)
Dept: FAMILY MEDICINE | Facility: CLINIC | Age: 74
End: 2019-01-30

## 2019-03-07 DIAGNOSIS — F33.1 MAJOR DEPRESSIVE DISORDER, RECURRENT EPISODE, MODERATE (H): ICD-10-CM

## 2019-03-08 RX ORDER — VENLAFAXINE HYDROCHLORIDE 150 MG/1
CAPSULE, EXTENDED RELEASE ORAL
Qty: 30 CAPSULE | Refills: 0 | Status: SHIPPED | OUTPATIENT
Start: 2019-03-08

## 2019-03-08 NOTE — TELEPHONE ENCOUNTER
"Pending Prescriptions:                       Disp   Refills    venlafaxine (EFFEXOR-XR) 150 MG 24 hr cap*90 cap*1            Sig: TAKE 1 CAPSULE BY MOUTH ONCE DAILY WITH            BREAKFAST      Last Written Prescription Date:  03/20/2018  Last Fill Quantity: 90,  # refills: 3   Last office visit: 7/26/2018 with prescribing provider:     Future Office Visit:        Requested Prescriptions   Pending Prescriptions Disp Refills     venlafaxine (EFFEXOR-XR) 150 MG 24 hr capsule [Pharmacy Med Name: VENLAFAXINE ER 150MG CAP] 90 capsule 1     Sig: TAKE 1 CAPSULE BY MOUTH ONCE DAILY WITH  BREAKFAST    Serotonin-Norepinephrine Reuptake Inhibitors  Failed - 3/7/2019  4:42 PM       Failed - PHQ-9 score of less than 5 in past 6 months    Please review last PHQ-9 score.          Failed - Recent (6 mo) or future (30 days) visit within the authorizing provider's specialty    Patient had office visit in the last 6 months or has a visit in the next 30 days with authorizing provider or within the authorizing provider's specialty.  See \"Patient Info\" tab in inbasket, or \"Choose Columns\" in Meds & Orders section of the refill encounter.           Passed - Blood pressure under 140/90 in past 12 months    BP Readings from Last 3 Encounters:   07/26/18 126/68   07/19/18 134/68   07/14/18 141/70                Passed - Medication is active on med list       Passed - Patient is age 18 or older       Passed - No active pregnancy on record       Passed - Normal serum creatinine on file in past 12 months    Recent Labs   Lab Test 07/14/18  0553   CR 0.71            Passed - No positive pregnancy test in past 12 months          "

## 2019-03-08 NOTE — TELEPHONE ENCOUNTER
Pt is due for annual OV. Refilled 30 day supply with note to pharmacy to inform patient to schedule an appointment     Adria BRYANT RN

## 2019-05-03 ENCOUNTER — HEALTH MAINTENANCE LETTER (OUTPATIENT)
Age: 74
End: 2019-05-03

## 2019-06-06 DIAGNOSIS — E11.40 TYPE 2 DIABETES MELLITUS WITH DIABETIC NEUROPATHY (H): ICD-10-CM

## 2019-06-07 NOTE — TELEPHONE ENCOUNTER
"Given one time refill 3/8/19  Overdue for 6 month diabetes follow up  Last seen 7/26/18    [facility] message sent to patient asking her to schedule an appointment.    Danay Coelho RN    Requested Prescriptions   Pending Prescriptions Disp Refills     BD NILDA U/F 32G X 4 MM insulin pen needle [Pharmacy Med Name: BD PEN NEEDLE/NILDA [id number] MIS] 100 each 0     Sig:  USE 1 OR 2 DAILY AS DIRECTED WITH LEVEMIR       Diabetic Supplies Protocol Failed - 6/6/2019  6:45 PM        Failed - Recent (6 mo) or future (30 days) visit within the authorizing provider's specialty     Patient had office visit in the last 6 months or has a visit in the next 30 days with authorizing provider.  See \"Patient Info\" tab in inbasket, or \"Choose Columns\" in Meds & Orders section of the refill encounter.            Passed - Medication is active on med list        Passed - Patient is 18 years of age or older          "

## 2019-06-12 RX ORDER — PEN NEEDLE, DIABETIC 32GX 5/32"
NEEDLE, DISPOSABLE MISCELLANEOUS
Qty: 100 EACH | Refills: 0 | OUTPATIENT
Start: 2019-06-12

## 2019-06-12 NOTE — TELEPHONE ENCOUNTER
Called patient - she has established care with a new PCP, pharmacy notified    She now lives in [place]    Heydi Joseph RN on 6/12/2019 at 2:37 PM

## 2019-09-30 ENCOUNTER — HEALTH MAINTENANCE LETTER (OUTPATIENT)
Age: 74
End: 2019-09-30

## 2020-03-15 ENCOUNTER — HEALTH MAINTENANCE LETTER (OUTPATIENT)
Age: 75
End: 2020-03-15

## 2021-01-15 ENCOUNTER — HEALTH MAINTENANCE LETTER (OUTPATIENT)
Age: 76
End: 2021-01-15

## 2021-03-14 ENCOUNTER — HEALTH MAINTENANCE LETTER (OUTPATIENT)
Age: 76
End: 2021-03-14

## 2021-05-09 ENCOUNTER — HEALTH MAINTENANCE LETTER (OUTPATIENT)
Age: 76
End: 2021-05-09

## 2021-07-20 NOTE — TELEPHONE ENCOUNTER
"ED / Discharge Outreach Protocol    Patient Contact    Attempt # 1    Was call answered?  Yes.  \"May I please speak with <patient name>\"  Is patient available?    Female answered the phone twice - both times said hello, then hung up  Will recall pt tomorrow    Asuncion SKINNER RN    DISCHARGE INSTRUCTIONS AND FOLLOW-UP:  Follow-up Appointments     Follow-up and recommended labs and tests        1. Follow up with your regular MD as scheduled 7/19     "
"ED / Discharge Outreach Protocol    Patient Contact    Attempt # 2    Was call answered?  Yes.  \"May I please speak with <patient name>\"  Is patient available?   Phone/patient continues to hang up when answered    Asuncion SKINNER RN      "
Hyperglycemia 07/14/2018 6 mo ED/IP 0/0  [phone] (home) [phone] (work)    
done

## 2021-08-29 ENCOUNTER — HEALTH MAINTENANCE LETTER (OUTPATIENT)
Age: 76
End: 2021-08-29

## 2021-10-24 ENCOUNTER — HEALTH MAINTENANCE LETTER (OUTPATIENT)
Age: 76
End: 2021-10-24

## 2022-04-10 ENCOUNTER — HEALTH MAINTENANCE LETTER (OUTPATIENT)
Age: 77
End: 2022-04-10

## 2022-06-05 ENCOUNTER — HEALTH MAINTENANCE LETTER (OUTPATIENT)
Age: 77
End: 2022-06-05

## 2022-10-15 ENCOUNTER — HEALTH MAINTENANCE LETTER (OUTPATIENT)
Age: 77
End: 2022-10-15

## 2023-06-01 ENCOUNTER — HEALTH MAINTENANCE LETTER (OUTPATIENT)
Age: 78
End: 2023-06-01

## 2023-06-11 ENCOUNTER — HEALTH MAINTENANCE LETTER (OUTPATIENT)
Age: 78
End: 2023-06-11

## 2024-01-07 ENCOUNTER — HEALTH MAINTENANCE LETTER (OUTPATIENT)
Age: 79
End: 2024-01-07